# Patient Record
Sex: FEMALE | Race: WHITE | NOT HISPANIC OR LATINO
[De-identification: names, ages, dates, MRNs, and addresses within clinical notes are randomized per-mention and may not be internally consistent; named-entity substitution may affect disease eponyms.]

---

## 2020-11-12 ENCOUNTER — RESULT REVIEW (OUTPATIENT)
Age: 65
End: 2020-11-12

## 2024-11-16 DIAGNOSIS — M25.562 PAIN IN RIGHT KNEE: ICD-10-CM

## 2024-11-16 DIAGNOSIS — M54.16 RADICULOPATHY, LUMBAR REGION: ICD-10-CM

## 2024-11-16 DIAGNOSIS — M25.561 PAIN IN RIGHT KNEE: ICD-10-CM

## 2024-11-16 DIAGNOSIS — M17.0 BILATERAL PRIMARY OSTEOARTHRITIS OF KNEE: ICD-10-CM

## 2024-11-16 PROCEDURE — 20610 DRAIN/INJ JOINT/BURSA W/O US: CPT | Mod: 50

## 2024-11-16 PROCEDURE — 99204 OFFICE O/P NEW MOD 45 MIN: CPT | Mod: 25

## 2024-11-16 PROCEDURE — 73564 X-RAY EXAM KNEE 4 OR MORE: CPT | Mod: 50

## 2024-11-18 ENCOUNTER — APPOINTMENT (OUTPATIENT)
Dept: ORTHOPEDIC SURGERY | Facility: CLINIC | Age: 69
End: 2024-11-18
Payer: MEDICARE

## 2024-11-18 ENCOUNTER — NON-APPOINTMENT (OUTPATIENT)
Age: 69
End: 2024-11-18

## 2024-11-18 VITALS — HEIGHT: 61 IN | BODY MASS INDEX: 26.43 KG/M2 | WEIGHT: 140 LBS

## 2024-11-18 PROCEDURE — 99204 OFFICE O/P NEW MOD 45 MIN: CPT | Mod: 25

## 2024-11-18 PROCEDURE — 20610 DRAIN/INJ JOINT/BURSA W/O US: CPT | Mod: 50

## 2024-11-18 PROCEDURE — 99214 OFFICE O/P EST MOD 30 MIN: CPT | Mod: 25

## 2024-11-18 PROCEDURE — 73564 X-RAY EXAM KNEE 4 OR MORE: CPT | Mod: 50

## 2024-11-18 RX ORDER — MELOXICAM 7.5 MG/1
7.5 TABLET ORAL DAILY
Qty: 30 | Refills: 2 | Status: ACTIVE | COMMUNITY
Start: 2024-11-18 | End: 1900-01-01

## 2024-11-20 PROBLEM — M25.561 BILATERAL KNEE PAIN: Status: ACTIVE | Noted: 2024-11-16

## 2024-11-21 PROBLEM — M17.0 OSTEOARTHRITIS OF KNEES, BILATERAL: Status: ACTIVE | Noted: 2024-11-18

## 2024-11-21 PROBLEM — M54.16 LUMBAR RADICULOPATHY: Status: ACTIVE | Noted: 2024-11-18

## 2024-12-04 ENCOUNTER — APPOINTMENT (OUTPATIENT)
Dept: ORTHOPEDIC SURGERY | Facility: CLINIC | Age: 69
End: 2024-12-04
Payer: MEDICARE

## 2024-12-04 DIAGNOSIS — S83.231A COMPLEX TEAR OF MEDIAL MENISCUS, CURRENT INJURY, RIGHT KNEE, INITIAL ENCOUNTER: ICD-10-CM

## 2024-12-04 DIAGNOSIS — Q68.6 DISCOID MENISCUS: ICD-10-CM

## 2024-12-04 DIAGNOSIS — S83.232A COMPLEX TEAR OF MEDIAL MENISCUS, CURRENT INJURY, LEFT KNEE, INITIAL ENCOUNTER: ICD-10-CM

## 2024-12-04 DIAGNOSIS — M54.16 RADICULOPATHY, LUMBAR REGION: ICD-10-CM

## 2024-12-04 DIAGNOSIS — M17.0 BILATERAL PRIMARY OSTEOARTHRITIS OF KNEE: ICD-10-CM

## 2024-12-04 PROCEDURE — ZZZZZ: CPT

## 2025-04-22 ENCOUNTER — NON-APPOINTMENT (OUTPATIENT)
Age: 70
End: 2025-04-22

## 2025-04-23 ENCOUNTER — APPOINTMENT (OUTPATIENT)
Dept: ORTHOPEDIC SURGERY | Facility: CLINIC | Age: 70
End: 2025-04-23
Payer: MEDICARE

## 2025-04-23 VITALS — BODY MASS INDEX: 27.94 KG/M2 | HEIGHT: 61 IN | WEIGHT: 148 LBS

## 2025-04-23 DIAGNOSIS — M48.062 SPINAL STENOSIS, LUMBAR REGION WITH NEUROGENIC CLAUDICATION: ICD-10-CM

## 2025-04-23 DIAGNOSIS — M54.16 RADICULOPATHY, LUMBAR REGION: ICD-10-CM

## 2025-04-23 DIAGNOSIS — M43.10 SPONDYLOLISTHESIS, SITE UNSPECIFIED: ICD-10-CM

## 2025-04-23 PROCEDURE — 72110 X-RAY EXAM L-2 SPINE 4/>VWS: CPT

## 2025-04-23 PROCEDURE — 99214 OFFICE O/P EST MOD 30 MIN: CPT

## 2025-04-24 PROBLEM — M48.062 LUMBAR STENOSIS WITH NEUROGENIC CLAUDICATION: Status: ACTIVE | Noted: 2025-04-24

## 2025-04-24 PROBLEM — M43.10 ACQUIRED SPONDYLOLISTHESIS: Status: ACTIVE | Noted: 2025-04-24

## 2025-05-12 ENCOUNTER — APPOINTMENT (OUTPATIENT)
Dept: ORTHOPEDIC SURGERY | Facility: CLINIC | Age: 70
End: 2025-05-12
Payer: MEDICARE

## 2025-05-12 DIAGNOSIS — M43.10 SPONDYLOLISTHESIS, SITE UNSPECIFIED: ICD-10-CM

## 2025-05-12 DIAGNOSIS — M48.062 SPINAL STENOSIS, LUMBAR REGION WITH NEUROGENIC CLAUDICATION: ICD-10-CM

## 2025-05-12 PROCEDURE — 99214 OFFICE O/P EST MOD 30 MIN: CPT

## 2025-05-28 ENCOUNTER — NON-APPOINTMENT (OUTPATIENT)
Age: 70
End: 2025-05-28

## 2025-06-06 ENCOUNTER — OUTPATIENT (OUTPATIENT)
Dept: OUTPATIENT SERVICES | Facility: HOSPITAL | Age: 70
LOS: 1 days | End: 2025-06-06
Payer: MEDICARE

## 2025-06-06 VITALS
HEART RATE: 70 BPM | RESPIRATION RATE: 14 BRPM | HEIGHT: 61 IN | TEMPERATURE: 98 F | DIASTOLIC BLOOD PRESSURE: 60 MMHG | SYSTOLIC BLOOD PRESSURE: 113 MMHG | WEIGHT: 143.96 LBS | OXYGEN SATURATION: 98 %

## 2025-06-06 DIAGNOSIS — M54.16 RADICULOPATHY, LUMBAR REGION: ICD-10-CM

## 2025-06-06 DIAGNOSIS — M48.062 SPINAL STENOSIS, LUMBAR REGION WITH NEUROGENIC CLAUDICATION: ICD-10-CM

## 2025-06-06 DIAGNOSIS — Z90.89 ACQUIRED ABSENCE OF OTHER ORGANS: Chronic | ICD-10-CM

## 2025-06-06 DIAGNOSIS — Z01.818 ENCOUNTER FOR OTHER PREPROCEDURAL EXAMINATION: ICD-10-CM

## 2025-06-06 LAB
A1C WITH ESTIMATED AVERAGE GLUCOSE RESULT: 6.2 % — HIGH (ref 4–5.6)
ALBUMIN SERPL ELPH-MCNC: 3.8 G/DL — SIGNIFICANT CHANGE UP (ref 3.3–5)
ALP SERPL-CCNC: 107 U/L — SIGNIFICANT CHANGE UP (ref 30–120)
ALT FLD-CCNC: 36 U/L — SIGNIFICANT CHANGE UP (ref 10–60)
ANION GAP SERPL CALC-SCNC: 6 MMOL/L — SIGNIFICANT CHANGE UP (ref 5–17)
APTT BLD: 35.8 SEC — SIGNIFICANT CHANGE UP (ref 26.1–36.8)
AST SERPL-CCNC: 16 U/L — SIGNIFICANT CHANGE UP (ref 10–40)
BILIRUB SERPL-MCNC: 0.4 MG/DL — SIGNIFICANT CHANGE UP (ref 0.2–1.2)
BLD GP AB SCN SERPL QL: SIGNIFICANT CHANGE UP
BUN SERPL-MCNC: 14 MG/DL — SIGNIFICANT CHANGE UP (ref 7–23)
CALCIUM SERPL-MCNC: 9.5 MG/DL — SIGNIFICANT CHANGE UP (ref 8.4–10.5)
CHLORIDE SERPL-SCNC: 103 MMOL/L — SIGNIFICANT CHANGE UP (ref 96–108)
CO2 SERPL-SCNC: 31 MMOL/L — SIGNIFICANT CHANGE UP (ref 22–31)
CREAT SERPL-MCNC: 0.62 MG/DL — SIGNIFICANT CHANGE UP (ref 0.5–1.3)
EGFR: 96 ML/MIN/1.73M2 — SIGNIFICANT CHANGE UP
EGFR: 96 ML/MIN/1.73M2 — SIGNIFICANT CHANGE UP
ESTIMATED AVERAGE GLUCOSE: 131 MG/DL — HIGH (ref 68–114)
GLUCOSE SERPL-MCNC: 107 MG/DL — HIGH (ref 70–99)
HCT VFR BLD CALC: 40.9 % — SIGNIFICANT CHANGE UP (ref 34.5–45)
HGB BLD-MCNC: 13.3 G/DL — SIGNIFICANT CHANGE UP (ref 11.5–15.5)
INR BLD: 0.99 RATIO — SIGNIFICANT CHANGE UP (ref 0.85–1.16)
MCHC RBC-ENTMCNC: 26.9 PG — LOW (ref 27–34)
MCHC RBC-ENTMCNC: 32.5 G/DL — SIGNIFICANT CHANGE UP (ref 32–36)
MCV RBC AUTO: 82.6 FL — SIGNIFICANT CHANGE UP (ref 80–100)
NRBC BLD AUTO-RTO: 0 /100 WBCS — SIGNIFICANT CHANGE UP (ref 0–0)
PLATELET # BLD AUTO: 311 K/UL — SIGNIFICANT CHANGE UP (ref 150–400)
POTASSIUM SERPL-MCNC: 4.1 MMOL/L — SIGNIFICANT CHANGE UP (ref 3.5–5.3)
POTASSIUM SERPL-SCNC: 4.1 MMOL/L — SIGNIFICANT CHANGE UP (ref 3.5–5.3)
PROT SERPL-MCNC: 7.6 G/DL — SIGNIFICANT CHANGE UP (ref 6–8.3)
PROTHROM AB SERPL-ACNC: 11.5 SEC — SIGNIFICANT CHANGE UP (ref 9.9–13.4)
RBC # BLD: 4.95 M/UL — SIGNIFICANT CHANGE UP (ref 3.8–5.2)
RBC # FLD: 13.1 % — SIGNIFICANT CHANGE UP (ref 10.3–14.5)
SODIUM SERPL-SCNC: 140 MMOL/L — SIGNIFICANT CHANGE UP (ref 135–145)
WBC # BLD: 7.77 K/UL — SIGNIFICANT CHANGE UP (ref 3.8–10.5)
WBC # FLD AUTO: 7.77 K/UL — SIGNIFICANT CHANGE UP (ref 3.8–10.5)

## 2025-06-06 PROCEDURE — 86900 BLOOD TYPING SEROLOGIC ABO: CPT

## 2025-06-06 PROCEDURE — G0463: CPT

## 2025-06-06 PROCEDURE — 85610 PROTHROMBIN TIME: CPT

## 2025-06-06 PROCEDURE — 86901 BLOOD TYPING SEROLOGIC RH(D): CPT

## 2025-06-06 PROCEDURE — 80053 COMPREHEN METABOLIC PANEL: CPT

## 2025-06-06 PROCEDURE — 85730 THROMBOPLASTIN TIME PARTIAL: CPT

## 2025-06-06 PROCEDURE — 85027 COMPLETE CBC AUTOMATED: CPT

## 2025-06-06 PROCEDURE — 36415 COLL VENOUS BLD VENIPUNCTURE: CPT

## 2025-06-06 PROCEDURE — 86850 RBC ANTIBODY SCREEN: CPT

## 2025-06-06 PROCEDURE — 83036 HEMOGLOBIN GLYCOSYLATED A1C: CPT

## 2025-06-06 NOTE — H&P PST ADULT - NSICDXPROCEDURE_GEN_ALL_CORE_FT
PROCEDURES:  Laminectomy or decompressive laminectomy of lumbar spine at 2 levels 06-Jun-2025 07:31:33 L4-5, L5-S1 Debbie Abarca   PROCEDURES:  Laminectomy or decompressive laminectomy of lumbar spine at 2 levels 06-Jun-2025 07:31:33 L4-5, L5-S1 with fusion Debbie Abarca

## 2025-06-06 NOTE — H&P PST ADULT - PROBLEM SELECTOR PLAN 1
scheduled for decompressive laminectomy L$-%, L5-S1  fusion   will obtain medical clearance   pre op instructions on medications

## 2025-06-06 NOTE — H&P PST ADULT - NEUROLOGICAL
sensation intact/responds to pain negative sensation intact/responds to pain/responds to verbal commands

## 2025-06-06 NOTE — H&P PST ADULT - HISTORY OF PRESENT ILLNESS
70 y/o female with 4 year history of lower back pain and pain has progressively gotten worse over the past 6 months . patient reports  bilateral lower back pain  radiating into bilateral lateral hips and the right leg pain continues into the right foot and left leg has started radiating into mid thigh.Recently has undergone FERNANDO for the lumbar spine on  3/2025 with no relief of her symptoms. Pian is worsened by walking , standing and activities with pain scale 7/10 . currently not taking any medications . scheduled for decompressive laminectomy L4-5,L5-S1 with fusion on 6/17/25

## 2025-06-06 NOTE — H&P PST ADULT - GASTROINTESTINAL
details… nontender/nondistended/normal active bowel sounds soft/nontender/nondistended/normal active bowel sounds

## 2025-06-06 NOTE — H&P PST ADULT - NSICDXPASTMEDICALHX_GEN_ALL_CORE_FT
PAST MEDICAL HISTORY:  History of psoriasis     HLD (hyperlipidemia)     HTN (hypertension)     Lumbar radiculopathy

## 2025-06-06 NOTE — H&P PST ADULT - NSICDXFAMILYHX_GEN_ALL_CORE_FT
FAMILY HISTORY:  Father  Still living? No  FH: CAD (coronary artery disease), Age at diagnosis: Age Unknown    Mother  Still living? Yes, Estimated age: Age Unknown  Family history of breast cancer in mother, Age at diagnosis: Age Unknown  Family history of colon cancer in mother, Age at diagnosis: Age Unknown    Child  Still living? Yes, Estimated age: Age Unknown  Family history of psoriasis, Age at diagnosis: Age Unknown

## 2025-06-17 ENCOUNTER — RESULT REVIEW (OUTPATIENT)
Age: 70
End: 2025-06-17

## 2025-06-17 ENCOUNTER — TRANSCRIPTION ENCOUNTER (OUTPATIENT)
Age: 70
End: 2025-06-17

## 2025-06-17 ENCOUNTER — INPATIENT (INPATIENT)
Facility: HOSPITAL | Age: 70
LOS: 2 days | Discharge: HOME | DRG: 552 | End: 2025-06-20
Attending: ORTHOPAEDIC SURGERY | Admitting: ORTHOPAEDIC SURGERY
Payer: MEDICARE

## 2025-06-17 ENCOUNTER — APPOINTMENT (OUTPATIENT)
Dept: ORTHOPEDIC SURGERY | Facility: HOSPITAL | Age: 70
End: 2025-06-17

## 2025-06-17 VITALS
HEIGHT: 61 IN | DIASTOLIC BLOOD PRESSURE: 84 MMHG | WEIGHT: 142.42 LBS | TEMPERATURE: 97 F | OXYGEN SATURATION: 96 % | RESPIRATION RATE: 19 BRPM | HEART RATE: 97 BPM | SYSTOLIC BLOOD PRESSURE: 153 MMHG

## 2025-06-17 DIAGNOSIS — Z90.89 ACQUIRED ABSENCE OF OTHER ORGANS: Chronic | ICD-10-CM

## 2025-06-17 DIAGNOSIS — M48.062 SPINAL STENOSIS, LUMBAR REGION WITH NEUROGENIC CLAUDICATION: ICD-10-CM

## 2025-06-17 DIAGNOSIS — M54.16 RADICULOPATHY, LUMBAR REGION: ICD-10-CM

## 2025-06-17 PROBLEM — I10 ESSENTIAL (PRIMARY) HYPERTENSION: Chronic | Status: ACTIVE | Noted: 2025-06-06

## 2025-06-17 PROBLEM — E78.5 HYPERLIPIDEMIA, UNSPECIFIED: Chronic | Status: ACTIVE | Noted: 2025-06-06

## 2025-06-17 PROCEDURE — 99222 1ST HOSP IP/OBS MODERATE 55: CPT

## 2025-06-17 PROCEDURE — 63048 LAM FACETEC &FORAMOT EA ADDL: CPT

## 2025-06-17 PROCEDURE — 88311 DECALCIFY TISSUE: CPT | Mod: 26

## 2025-06-17 PROCEDURE — 22612 ARTHRD PST TQ 1NTRSPC LUMBAR: CPT | Mod: 82

## 2025-06-17 PROCEDURE — 22614 ARTHRD PST TQ 1NTRSPC EA ADD: CPT

## 2025-06-17 PROCEDURE — 22614 ARTHRD PST TQ 1NTRSPC EA ADD: CPT | Mod: 82

## 2025-06-17 PROCEDURE — 22612 ARTHRD PST TQ 1NTRSPC LUMBAR: CPT

## 2025-06-17 PROCEDURE — 63048 LAM FACETEC &FORAMOT EA ADDL: CPT | Mod: 82

## 2025-06-17 PROCEDURE — 88304 TISSUE EXAM BY PATHOLOGIST: CPT | Mod: 26

## 2025-06-17 PROCEDURE — 22842 INSERT SPINE FIXATION DEVICE: CPT | Mod: 82

## 2025-06-17 PROCEDURE — 63047 LAM FACETEC & FORAMOT LUMBAR: CPT | Mod: 82

## 2025-06-17 PROCEDURE — 63047 LAM FACETEC & FORAMOT LUMBAR: CPT

## 2025-06-17 PROCEDURE — 22842 INSERT SPINE FIXATION DEVICE: CPT

## 2025-06-17 DEVICE — SCREW SET MAS 2 PK SS 5.5/6MM: Type: IMPLANTABLE DEVICE | Status: FUNCTIONAL

## 2025-06-17 DEVICE — GRAFT I-FACTOR PUTTY 5CC: Type: IMPLANTABLE DEVICE | Status: FUNCTIONAL

## 2025-06-17 DEVICE — SCREW SET MAS 4 PK SS 5.5/6MM: Type: IMPLANTABLE DEVICE | Status: FUNCTIONAL

## 2025-06-17 DEVICE — IMPLANTABLE DEVICE: Type: IMPLANTABLE DEVICE | Status: FUNCTIONAL

## 2025-06-17 DEVICE — SURGIFLO HEMOSTATIC MATRIX KIT: Type: IMPLANTABLE DEVICE | Status: FUNCTIONAL

## 2025-06-17 DEVICE — LUKENS BONE WAX 2.5G: Type: IMPLANTABLE DEVICE | Status: FUNCTIONAL

## 2025-06-17 DEVICE — SURGIFOAM 8 X 12.5CM X 10MM (100): Type: IMPLANTABLE DEVICE | Status: FUNCTIONAL

## 2025-06-17 DEVICE — ROD TI 60MM: Type: IMPLANTABLE DEVICE | Status: FUNCTIONAL

## 2025-06-17 RX ORDER — METHOCARBAMOL 500 MG/1
500 TABLET, FILM COATED ORAL EVERY 8 HOURS
Refills: 0 | Status: DISCONTINUED | OUTPATIENT
Start: 2025-06-17 | End: 2025-06-20

## 2025-06-17 RX ORDER — ACETAMINOPHEN 500 MG/5ML
1000 LIQUID (ML) ORAL EVERY 8 HOURS
Refills: 0 | Status: DISCONTINUED | OUTPATIENT
Start: 2025-06-17 | End: 2025-06-19

## 2025-06-17 RX ORDER — AMLODIPINE BESYLATE 10 MG/1
10 TABLET ORAL DAILY
Refills: 0 | Status: DISCONTINUED | OUTPATIENT
Start: 2025-06-19 | End: 2025-06-20

## 2025-06-17 RX ORDER — HYDROMORPHONE/SOD CHLOR,ISO/PF 2 MG/10 ML
4 SYRINGE (ML) INJECTION
Refills: 0 | Status: DISCONTINUED | OUTPATIENT
Start: 2025-06-17 | End: 2025-06-19

## 2025-06-17 RX ORDER — CEFAZOLIN SODIUM IN 0.9 % NACL 3 G/100 ML
2000 INTRAVENOUS SOLUTION, PIGGYBACK (ML) INTRAVENOUS EVERY 8 HOURS
Refills: 0 | Status: COMPLETED | OUTPATIENT
Start: 2025-06-17 | End: 2025-06-18

## 2025-06-17 RX ORDER — ONDANSETRON HCL/PF 4 MG/2 ML
4 VIAL (ML) INJECTION ONCE
Refills: 0 | Status: DISCONTINUED | OUTPATIENT
Start: 2025-06-17 | End: 2025-06-17

## 2025-06-17 RX ORDER — TRANEXAMIC ACID 1000 MG/10
2000 AMPUL (ML) INTRAVENOUS ONCE
Refills: 0 | Status: COMPLETED | OUTPATIENT
Start: 2025-06-17 | End: 2025-06-17

## 2025-06-17 RX ORDER — OXYCODONE HYDROCHLORIDE 30 MG/1
5 TABLET ORAL ONCE
Refills: 0 | Status: DISCONTINUED | OUTPATIENT
Start: 2025-06-17 | End: 2025-06-17

## 2025-06-17 RX ORDER — MAGNESIUM, ALUMINUM HYDROXIDE 200-200 MG
30 TABLET,CHEWABLE ORAL EVERY 12 HOURS
Refills: 0 | Status: DISCONTINUED | OUTPATIENT
Start: 2025-06-17 | End: 2025-06-20

## 2025-06-17 RX ORDER — SENNA 187 MG
2 TABLET ORAL AT BEDTIME
Refills: 0 | Status: DISCONTINUED | OUTPATIENT
Start: 2025-06-17 | End: 2025-06-20

## 2025-06-17 RX ORDER — LOSARTAN POTASSIUM 100 MG/1
50 TABLET, FILM COATED ORAL DAILY
Refills: 0 | Status: DISCONTINUED | OUTPATIENT
Start: 2025-06-19 | End: 2025-06-20

## 2025-06-17 RX ORDER — CEFAZOLIN SODIUM IN 0.9 % NACL 3 G/100 ML
2000 INTRAVENOUS SOLUTION, PIGGYBACK (ML) INTRAVENOUS ONCE
Refills: 0 | Status: COMPLETED | OUTPATIENT
Start: 2025-06-17 | End: 2025-06-17

## 2025-06-17 RX ORDER — POLYETHYLENE GLYCOL 3350 17 G/17G
17 POWDER, FOR SOLUTION ORAL DAILY
Refills: 0 | Status: DISCONTINUED | OUTPATIENT
Start: 2025-06-17 | End: 2025-06-20

## 2025-06-17 RX ORDER — ACETAMINOPHEN 500 MG/5ML
1000 LIQUID (ML) ORAL ONCE
Refills: 0 | Status: COMPLETED | OUTPATIENT
Start: 2025-06-17 | End: 2025-06-17

## 2025-06-17 RX ORDER — ATORVASTATIN CALCIUM 80 MG/1
10 TABLET, FILM COATED ORAL AT BEDTIME
Refills: 0 | Status: DISCONTINUED | OUTPATIENT
Start: 2025-06-17 | End: 2025-06-20

## 2025-06-17 RX ORDER — HYDROMORPHONE/SOD CHLOR,ISO/PF 2 MG/10 ML
0.5 SYRINGE (ML) INJECTION
Refills: 0 | Status: DISCONTINUED | OUTPATIENT
Start: 2025-06-17 | End: 2025-06-17

## 2025-06-17 RX ORDER — APREPITANT 40 MG/1
40 CAPSULE ORAL ONCE
Refills: 0 | Status: COMPLETED | OUTPATIENT
Start: 2025-06-17 | End: 2025-06-17

## 2025-06-17 RX ORDER — LOSARTAN POTASSIUM 100 MG/1
1 TABLET, FILM COATED ORAL
Refills: 0 | DISCHARGE

## 2025-06-17 RX ORDER — AMLODIPINE BESYLATE 10 MG/1
1 TABLET ORAL
Refills: 0 | DISCHARGE

## 2025-06-17 RX ORDER — EZETIMIBE 10 MG/1
10 TABLET ORAL DAILY
Refills: 0 | Status: DISCONTINUED | OUTPATIENT
Start: 2025-06-17 | End: 2025-06-20

## 2025-06-17 RX ORDER — HYDROMORPHONE/SOD CHLOR,ISO/PF 2 MG/10 ML
2 SYRINGE (ML) INJECTION
Refills: 0 | Status: DISCONTINUED | OUTPATIENT
Start: 2025-06-17 | End: 2025-06-19

## 2025-06-17 RX ORDER — SODIUM CHLORIDE 9 G/1000ML
1000 INJECTION, SOLUTION INTRAVENOUS
Refills: 0 | Status: DISCONTINUED | OUTPATIENT
Start: 2025-06-17 | End: 2025-06-17

## 2025-06-17 RX ORDER — SODIUM CHLORIDE 9 G/1000ML
1000 INJECTION, SOLUTION INTRAVENOUS
Refills: 0 | Status: DISCONTINUED | OUTPATIENT
Start: 2025-06-17 | End: 2025-06-20

## 2025-06-17 RX ORDER — HYDROMORPHONE/SOD CHLOR,ISO/PF 2 MG/10 ML
0.5 SYRINGE (ML) INJECTION
Refills: 0 | Status: DISCONTINUED | OUTPATIENT
Start: 2025-06-17 | End: 2025-06-19

## 2025-06-17 RX ORDER — HYDROMORPHONE/SOD CHLOR,ISO/PF 2 MG/10 ML
0.2 SYRINGE (ML) INJECTION
Refills: 0 | Status: DISCONTINUED | OUTPATIENT
Start: 2025-06-17 | End: 2025-06-17

## 2025-06-17 RX ORDER — EZETIMIBE 10 MG/1
1 TABLET ORAL
Refills: 0 | DISCHARGE

## 2025-06-17 RX ORDER — ONDANSETRON HCL/PF 4 MG/2 ML
4 VIAL (ML) INJECTION EVERY 6 HOURS
Refills: 0 | Status: DISCONTINUED | OUTPATIENT
Start: 2025-06-17 | End: 2025-06-20

## 2025-06-17 RX ADMIN — METHOCARBAMOL 500 MILLIGRAM(S): 500 TABLET, FILM COATED ORAL at 21:31

## 2025-06-17 RX ADMIN — Medication 1000 MILLIGRAM(S): at 23:00

## 2025-06-17 RX ADMIN — Medication 0.5 MILLIGRAM(S): at 14:57

## 2025-06-17 RX ADMIN — ATORVASTATIN CALCIUM 10 MILLIGRAM(S): 80 TABLET, FILM COATED ORAL at 21:31

## 2025-06-17 RX ADMIN — SODIUM CHLORIDE 70 MILLILITER(S): 9 INJECTION, SOLUTION INTRAVENOUS at 14:22

## 2025-06-17 RX ADMIN — APREPITANT 40 MILLIGRAM(S): 40 CAPSULE ORAL at 08:18

## 2025-06-17 RX ADMIN — Medication 1000 MILLIGRAM(S): at 00:00

## 2025-06-17 RX ADMIN — Medication 2 MILLIGRAM(S): at 18:00

## 2025-06-17 RX ADMIN — Medication 1000 MILLIGRAM(S): at 21:31

## 2025-06-17 RX ADMIN — Medication 100 MILLIGRAM(S): at 17:01

## 2025-06-17 RX ADMIN — Medication 400 MILLIGRAM(S): at 16:28

## 2025-06-17 RX ADMIN — SODIUM CHLORIDE 100 MILLILITER(S): 9 INJECTION, SOLUTION INTRAVENOUS at 16:28

## 2025-06-17 RX ADMIN — Medication 0.5 MILLIGRAM(S): at 14:21

## 2025-06-17 RX ADMIN — Medication 2 MILLIGRAM(S): at 17:04

## 2025-06-17 NOTE — BRIEF OPERATIVE NOTE - NSICDXBRIEFPREOP_GEN_ALL_CORE_FT
PRE-OP DIAGNOSIS:  Spinal stenosis of lumbar region with neurogenic claudication 17-Jun-2025 13:00:52  Romario Mcpherson

## 2025-06-17 NOTE — CONSULT NOTE ADULT - SUBJECTIVE AND OBJECTIVE BOX
HPI:  69F with HTN, HLD, lumbar radiculopathy who has been suffering with back pain for years.  Patient experiencing pain that would radiate down her legs.  Has tried conservative measures.  Interfering with her ADLs.  Recommended for surgery.  Patient seen in PACU.  Is currently have some lumbar pain.  Some weakness still in her legs with left more than right but no numbness.  No nausea.  No other complaints.      PAST MEDICAL & SURGICAL HISTORY:  HTN (hypertension)  HLD (hyperlipidemia)  Lumbar radiculopathy  History of psoriasis  S/P tonsillectomy    REVIEW OF SYSTEMS:  CONSTITUTIONAL:    no fever or weight loss or fatigue  EYES:    no eye pain or visual disturbances or discharge  ENMT:     no difficulty hearing or tinnitus or vertigo, no sinus or throat pain  NECK:    no pain or stiffness  RESPIRATORY:    no cough or wheezing or chills or hemoptysis, no shortness of breath  CARDIOVASCULAR:    no chest pain or palpitations or dizziness or leg swelling  GASTROINTESTINAL:    no abdominal or epigastric pain. no nausea or vomiting or hematemesis, no diarrhea or constipation. no melena or hematochezia.  GENITOURINARY:    no dysuria or frequency or hematuria or incontinence  NEUROLOGICAL:    slight weakness in her legs  SKIN:    no itching or burning or rashes or lesions   LYMPH NODES:    no enlarged glands  ENDOCRINE:    no heat or cold intolerance, no hair loss, no polydipsia or polyuria  MUSCULOSKELETAL:    no joint pain or swelling, no muscle or back or extremity pain  PSYCHIATRIC:    no depression or anxiety or mood swings or difficulty sleeping  HEME/LYMPH:    no easy bruising or bleeding gums  ALLERGY AND IMMUNOLOGIC:    no hives or eczema      HOME MEDICATIONS:    Home Medications:  amLODIPine 10 mg oral tablet: 1 tab(s) orally once a day (17 Jun 2025 08:10)  ezetimibe 10 mg oral tablet: 1 tab(s) orally once a day (17 Jun 2025 08:10)  losartan 50 mg oral tablet: 1 tab(s) orally once a day (17 Jun 2025 08:10)  simvastatin 20 mg oral tablet: 1 tab(s) orally once a day (17 Jun 2025 08:10)    ALLERGIES and INTOLERANCES:  No Known Allergies    SOCIAL HISTORY:  - no smoking  - no etoh use    FAMILY HISTORY:  Family history of breast cancer in mother (Mother)  Family history of colon cancer in mother (Mother)  FH: CAD (coronary artery disease) (Father)  Family history of psoriasis (Child)    PHYSICAL EXAM:  Vital Signs Last 24 Hrs  T(C): 36.4 (17 Jun 2025 12:56), Max: 36.4 (17 Jun 2025 12:56)  T(F): 97.6 (17 Jun 2025 12:56), Max: 97.6 (17 Jun 2025 12:56)  HR: 71 (17 Jun 2025 13:56) (70 - 97)  BP: 97/58 (17 Jun 2025 13:56) (94/55 - 153/84)  BP(mean): --  RR: 17 (17 Jun 2025 13:56) (12 - 19)  SpO2: 97% (17 Jun 2025 13:56) (92% - 98%)    Parameters below as of 17 Jun 2025 13:56  Patient On (Oxygen Delivery Method): nasal cannula  O2 Flow (L/min): 2      GENERAL:     age appropriate, in NAD   HEAD:     atraumatic, normocephalic  EYES:     EOMI, conjunctiva and sclera clear  RESPIRATORY:     clear to auscultation bilaterally, no rales or rhonchi or wheezing or rubs  CARDIOVASCULAR:     regular rate and rhythm, no murmurs or rubs or gallops  GASTROINTESTINAL:     soft, nontender, nondistended, bowel sounds present  EXTREMITIES:     no clubbing or cyanosis or edema  MUSCULOSKELETAL:     no joint pain or swelling or deformities  NERVOUS SYSTEM:     does move both legs and feet, able to elevate legs, no sensory deficits noted  PSYCH:     appropriate, slightly groggy from anesthesia still      LABS:                        13.3   7.77  )-----------( 311      ( 06 Jun 2025 07:40 )             40.9     06 Jun 2025 07:40    140    |  103    |  14     ----------------------------<  107[H]  4.1     |  31     |  0.62         Ca    9.5        06 Jun 2025 07:40    TPro  7.6    /  Alb  3.8    /  TBili  0.4    /  DBili  x      /  AST  16     /  ALT  36     /  AlkPhos  107    06 Jun 2025 07:40    LIVER FUNCTIONS - ( 06 Jun 2025 07:40 )  Alb: 3.8 g/dL / Pro: 7.6 g/dL / ALK PHOS: 107 U/L / ALT: 36 U/L / AST: 16 U/L / GGT: x           PT/INR - ( 06 Jun 2025 07:40 )   PT: 11.5 ;   INR: 0.99          PTT - ( 06 Jun 2025 07:40 )  PTT:35.8

## 2025-06-17 NOTE — CONSULT NOTE ADULT - ASSESSMENT
69F with HTN, HLD, lumbar radiculopathy who has been suffering with back pain for years.      Lumbar surgery for lumbar radiculopathy - Laminectomy, spine, lumbar, with interlaminar lumbar instrumented fusion with L3-4 right fenestration  - POD #0    - reviewed patient's medical clearance and other outpatient notes  - post-op care as per ortho  - no DVT AC secondary to spine surgery  - pain control with standing tylenol 1000mg, dilaudid 2mg and 4mg for mod/severe respectively pain, dilaudid 0.5mg IV for breakthrough pain  - start robaxin PRN muscle spasms  - PT/ OT  - advance diet as tolerated  - anti-emetics PRN  - incentive spirometer  - bowel regiment    HTN/HLD  - restart amlodipine on POD #2  - cont ezetimibe  - restart losartan 50mg on POD #2  - cont simvastatin    Preventive measures  - no AC secondary to spine surgery  - SCD only

## 2025-06-17 NOTE — PHYSICAL THERAPY INITIAL EVALUATION ADULT - ADDITIONAL COMMENTS
Pt resides in pvt home with spouse, available to assist as needed upon d/c. Pt states 3STE +HR, denies stairs within. Pt needs RW. Pt has a walk-in shower +grab bars. Pt reports was independent prior to admission.

## 2025-06-17 NOTE — PRE-OP CHECKLIST - LOOSE TEETH
no

## 2025-06-17 NOTE — PHYSICAL THERAPY INITIAL EVALUATION ADULT - PERTINENT HX OF CURRENT PROBLEM, REHAB EVAL
pt is a 70 y/o F s/p decompressive laminectomy L4-5 L5-S1 and posterolateral fusion and any indicated procedures 6/17/25

## 2025-06-17 NOTE — PHYSICAL THERAPY INITIAL EVALUATION ADULT - MANUAL MUSCLE TESTING RESULTS, REHAB EVAL
generalized weakness secondary to surgery/spinal precautions B UE LE grossly >=3/5/grossly assessed due to

## 2025-06-17 NOTE — BRIEF OPERATIVE NOTE - NSICDXBRIEFPROCEDURE_GEN_ALL_CORE_FT
PROCEDURES:  Laminectomy, spine, lumbar, with interlaminar lumbar instrumented fusion 17-Jun-2025 12:59:55 L4-S1, with L3-4 right fenestration Romario Mcpherson

## 2025-06-17 NOTE — BRIEF OPERATIVE NOTE - NSICDXBRIEFPOSTOP_GEN_ALL_CORE_FT
POST-OP DIAGNOSIS:  Spinal stenosis of lumbar region with neurogenic claudication 17-Jun-2025 13:01:00  Romario Mcpherson

## 2025-06-18 ENCOUNTER — TRANSCRIPTION ENCOUNTER (OUTPATIENT)
Age: 70
End: 2025-06-18

## 2025-06-18 LAB
ANION GAP SERPL CALC-SCNC: 10 MMOL/L — SIGNIFICANT CHANGE UP (ref 5–17)
BASOPHILS # BLD AUTO: 0.01 K/UL — SIGNIFICANT CHANGE UP (ref 0–0.2)
BASOPHILS NFR BLD AUTO: 0.1 % — SIGNIFICANT CHANGE UP (ref 0–2)
BUN SERPL-MCNC: 9 MG/DL — SIGNIFICANT CHANGE UP (ref 7–23)
CALCIUM SERPL-MCNC: 9.4 MG/DL — SIGNIFICANT CHANGE UP (ref 8.4–10.5)
CHLORIDE SERPL-SCNC: 105 MMOL/L — SIGNIFICANT CHANGE UP (ref 96–108)
CO2 SERPL-SCNC: 27 MMOL/L — SIGNIFICANT CHANGE UP (ref 22–31)
CREAT SERPL-MCNC: 0.49 MG/DL — LOW (ref 0.5–1.3)
EGFR: 102 ML/MIN/1.73M2 — SIGNIFICANT CHANGE UP
EGFR: 102 ML/MIN/1.73M2 — SIGNIFICANT CHANGE UP
EOSINOPHIL # BLD AUTO: 0 K/UL — SIGNIFICANT CHANGE UP (ref 0–0.5)
EOSINOPHIL NFR BLD AUTO: 0 % — SIGNIFICANT CHANGE UP (ref 0–6)
GLUCOSE SERPL-MCNC: 128 MG/DL — HIGH (ref 70–99)
HCT VFR BLD CALC: 38.6 % — SIGNIFICANT CHANGE UP (ref 34.5–45)
HGB BLD-MCNC: 12.3 G/DL — SIGNIFICANT CHANGE UP (ref 11.5–15.5)
IMM GRANULOCYTES # BLD AUTO: 0.05 K/UL — SIGNIFICANT CHANGE UP (ref 0–0.07)
IMM GRANULOCYTES NFR BLD AUTO: 0.4 % — SIGNIFICANT CHANGE UP (ref 0–0.9)
LYMPHOCYTES # BLD AUTO: 1 K/UL — SIGNIFICANT CHANGE UP (ref 1–3.3)
LYMPHOCYTES NFR BLD AUTO: 7.7 % — LOW (ref 13–44)
MCHC RBC-ENTMCNC: 26 PG — LOW (ref 27–34)
MCHC RBC-ENTMCNC: 31.9 G/DL — LOW (ref 32–36)
MCV RBC AUTO: 81.6 FL — SIGNIFICANT CHANGE UP (ref 80–100)
MONOCYTES # BLD AUTO: 0.34 K/UL — SIGNIFICANT CHANGE UP (ref 0–0.9)
MONOCYTES NFR BLD AUTO: 2.6 % — SIGNIFICANT CHANGE UP (ref 2–14)
NEUTROPHILS # BLD AUTO: 11.66 K/UL — HIGH (ref 1.8–7.4)
NEUTROPHILS NFR BLD AUTO: 89.2 % — HIGH (ref 43–77)
NRBC # BLD AUTO: 0 K/UL — SIGNIFICANT CHANGE UP (ref 0–0)
NRBC # FLD: 0 K/UL — SIGNIFICANT CHANGE UP (ref 0–0)
NRBC BLD AUTO-RTO: 0 /100 WBCS — SIGNIFICANT CHANGE UP (ref 0–0)
PLATELET # BLD AUTO: 324 K/UL — SIGNIFICANT CHANGE UP (ref 150–400)
PMV BLD: 10.1 FL — SIGNIFICANT CHANGE UP (ref 7–13)
POTASSIUM SERPL-MCNC: 3.8 MMOL/L — SIGNIFICANT CHANGE UP (ref 3.5–5.3)
POTASSIUM SERPL-SCNC: 3.8 MMOL/L — SIGNIFICANT CHANGE UP (ref 3.5–5.3)
RBC # BLD: 4.73 M/UL — SIGNIFICANT CHANGE UP (ref 3.8–5.2)
RBC # FLD: 13 % — SIGNIFICANT CHANGE UP (ref 10.3–14.5)
SODIUM SERPL-SCNC: 142 MMOL/L — SIGNIFICANT CHANGE UP (ref 135–145)
WBC # BLD: 13.06 K/UL — HIGH (ref 3.8–10.5)
WBC # FLD AUTO: 13.06 K/UL — HIGH (ref 3.8–10.5)

## 2025-06-18 PROCEDURE — 72100 X-RAY EXAM L-S SPINE 2/3 VWS: CPT | Mod: 26

## 2025-06-18 PROCEDURE — 99232 SBSQ HOSP IP/OBS MODERATE 35: CPT

## 2025-06-18 RX ORDER — ACETAMINOPHEN 500 MG/5ML
2 LIQUID (ML) ORAL
Qty: 0 | Refills: 0 | DISCHARGE
Start: 2025-06-18

## 2025-06-18 RX ORDER — HYDROMORPHONE/SOD CHLOR,ISO/PF 2 MG/10 ML
1 SYRINGE (ML) INJECTION
Qty: 40 | Refills: 0
Start: 2025-06-18

## 2025-06-18 RX ORDER — POLYETHYLENE GLYCOL 3350 17 G/17G
17 POWDER, FOR SOLUTION ORAL
Qty: 0 | Refills: 0 | DISCHARGE
Start: 2025-06-18

## 2025-06-18 RX ORDER — TIZANIDINE 4 MG/1
2 TABLET ORAL
Qty: 30 | Refills: 0
Start: 2025-06-18

## 2025-06-18 RX ORDER — SENNA 187 MG
2 TABLET ORAL
Qty: 0 | Refills: 0 | DISCHARGE
Start: 2025-06-18

## 2025-06-18 RX ADMIN — Medication 1000 MILLIGRAM(S): at 14:01

## 2025-06-18 RX ADMIN — Medication 1000 MILLIGRAM(S): at 05:31

## 2025-06-18 RX ADMIN — POLYETHYLENE GLYCOL 3350 17 GRAM(S): 17 POWDER, FOR SOLUTION ORAL at 22:05

## 2025-06-18 RX ADMIN — Medication 1000 MILLIGRAM(S): at 22:01

## 2025-06-18 RX ADMIN — ATORVASTATIN CALCIUM 10 MILLIGRAM(S): 80 TABLET, FILM COATED ORAL at 22:03

## 2025-06-18 RX ADMIN — Medication 4 MILLIGRAM(S): at 00:34

## 2025-06-18 RX ADMIN — METHOCARBAMOL 500 MILLIGRAM(S): 500 TABLET, FILM COATED ORAL at 05:31

## 2025-06-18 RX ADMIN — Medication 4 MILLIGRAM(S): at 01:15

## 2025-06-18 RX ADMIN — SODIUM CHLORIDE 100 MILLILITER(S): 9 INJECTION, SOLUTION INTRAVENOUS at 00:31

## 2025-06-18 RX ADMIN — Medication 100 MILLIGRAM(S): at 00:31

## 2025-06-18 RX ADMIN — Medication 2 TABLET(S): at 22:01

## 2025-06-18 RX ADMIN — Medication 1000 MILLIGRAM(S): at 22:12

## 2025-06-18 RX ADMIN — Medication 1000 MILLIGRAM(S): at 06:00

## 2025-06-18 RX ADMIN — EZETIMIBE 10 MILLIGRAM(S): 10 TABLET ORAL at 22:04

## 2025-06-18 NOTE — CARE COORDINATION ASSESSMENT. - NSCAREPROVIDERS_GEN_ALL_CORE_FT
CARE PROVIDERS:  Administration: Virgen Silverio  Administration: Brynn Becker  Admitting: Travon Morrow  Attending: Travon Morrow  Covering Team: Vladimir Boyer  Infection Control: Jodi Martinez  Nurse: Lori Henry  Nurse: Lyndsay Long  Occupational Therapy: Frandy Bryan  Occupational Therapy: Sofia Christiansen  PCA/Nursing Assistant: Chan Paz  Physical Therapy: Lucie Kaur  Physical Therapy: Kwabena Charles  Primary Team: Romario Mcpherson  Primary Team: Parag Medina  Primary Team: Mg Akers  Primary Team: Alejandra Traylor  Primary Team: Alanis Alejo  Primary Team: Odalis Marquis  Primary Team: Jose Carlos Lopez  Primary Team: Lorena Drake  Respiratory Therapy: Sergei Rushing  : Aleja Smith  Team: [facility] Hospitalists, Team  UR// Supp. Assoc.: Aylin Mathur

## 2025-06-18 NOTE — OCCUPATIONAL THERAPY INITIAL EVALUATION ADULT - PERTINENT HX OF CURRENT PROBLEM, REHAB EVAL
pt is a 68 y/o F s/p decompressive laminectomy L4-5 L5-S1 and posterolateral fusion and any indicated procedures 6/17/25

## 2025-06-18 NOTE — CARE COORDINATION ASSESSMENT. - NSPASTMEDSURGHISTORY_GEN_ALL_CORE_FT
PAST MEDICAL & SURGICAL HISTORY:  History of psoriasis      Lumbar radiculopathy      HLD (hyperlipidemia)      HTN (hypertension)      S/P tonsillectomy

## 2025-06-18 NOTE — DISCHARGE NOTE NURSING/CASE MANAGEMENT/SOCIAL WORK - PATIENT PORTAL LINK FT
You can access the FollowMyHealth Patient Portal offered by [facility] by registering at the following website: [email or web address]. By joining [employer]’s FollowMyHealth portal, you will also be able to view your health information using other applications (apps) compatible with our system.

## 2025-06-18 NOTE — CARE COORDINATION ASSESSMENT. - NSDCPLANSERVICES_GEN_ALL_CORE
CM met with patient at bedside.  Patient. A&O x 4. Pt s/p  PROCEDURES:   Laminectomy, spine, lumbar, with interlaminar lumbar instrumented fusion 17-Jun-2025.  Pt  resting comfortably / Pt has no complaints at this time.  CM explained role of CM and availability to assist with discharge planning throughout stay.   Provided CM contact information and pt. verbalized understanding. Pt resides in pvt home with spouse, available to assist as needed upon d/c. Pt states 3STE +HR, denies stairs within. Pt needs RW. Pt has a walk-in shower +grab bars. Pt reports was independent prior to admission.  Patient identified her  as her caregiver at home who will assist her during her recuperation and will transport her home and to MD appointments.   Pt. is agreeable with PT/OT's recommendations for d/c plan to home. Patient will follow with her surgeon and start outpatient pt when cleared by orthopedics. Anticipate dc in the next 24 hrs, IMM reviewed with patient at bedside and signed.    Pt verbalizing understanding and in agreement with d/c plans.  DME: RX for rolling walker sent to [id number] to process and deliver to bedside.   pcp: Deacon Hernandez [phone]  Pt stated she will be receiving meds to bed from [facility] pharmacy prior to DC./No Anticipated Discharge Needs

## 2025-06-18 NOTE — DISCHARGE NOTE PROVIDER - NSDCFUSCHEDAPPT_GEN_ALL_CORE_FT
Travon Morrow  [facility]  ORTHOSURG [address]  Scheduled Appointment: 07/02/2025

## 2025-06-18 NOTE — DISCHARGE NOTE PROVIDER - NSDCCPCAREPLAN_GEN_ALL_CORE_FT
PRINCIPAL DISCHARGE DIAGNOSIS  Diagnosis: Lumbar spinal stenosis  Assessment and Plan of Treatment: with instability

## 2025-06-18 NOTE — OCCUPATIONAL THERAPY INITIAL EVALUATION ADULT - ADDITIONAL COMMENTS
Pt lives in private home with spouse with 3 LAURA with +HR with 0 inside. Pt was ADl and transfer I, needs RW (ordered). Pt has walk in shower with grab bars.

## 2025-06-18 NOTE — DISCHARGE NOTE PROVIDER - NSDCCPTREATMENT_GEN_ALL_CORE_FT
PRINCIPAL PROCEDURE  Procedure: Laminectomy with fusion of lumbar spine by posterior or posterolateral approach  Findings and Treatment: You have just had spine surgery.  Please take care of your back by adhering to some basic recommendations.  Your surgeon recommends taking acetaminophen (tylenol) 1000mg 3 times per day for the next 14 days.  Apply icepacks to the surgical area to reduce swelling and discomfort.  This can help to minimize the need for stronger medications.  No heavy lifting. Do not lift more than 10 pounds.  Avoid twisting and bending over.  Do NOT drive a car.  You may be driven in a car.  You may shower if the dressing is the clear plastic type or if you cover the existing dressing with plastic and tape to prevent it from getting wet.  Change dressing with dry, sterile gauze and tape if it becomes loose, wet or soiled.     Observe low back precautions as described by the Physical Therapist.  Walking is your best exercise.  It is best not to remain in one position for long periods such as long car rides.  Constipation is a common problem associated with surgery and pain medications.  You should ensure that you are drinking plenty of fluids and increasing your fruit and vegetable intake.  You are advised to take Docusate 100mg three times per day to prevent opioid induced constipation.  To treat opioid induced constipation use Senna (Senokot) or Bisacodyl (Dulcolax) or PEG 3350 (Miralax) every evening until your first bowel movement and then every evening as needed.  To treat opioid induced bloating and gas pain use Simethicone (Gas-X) 80 mg per label directions.   Smoking should be avoided.  Tobacco products are associated with back problems.       Call the doctor with questions, fevers, severe headache, bowel or bladder dysfunction, new numbness/weakness or new pain not relieved by medication, ice pack and change of position.  You should see your surgeon for follow up within 14 days of surgery.

## 2025-06-18 NOTE — DISCHARGE NOTE PROVIDER - CARE PROVIDER_API CALL
Leone, Vincent Jean-Claude  Orthopaedic Surgery  [address], [address]  [place] [address]  Phone: [phone]  Fax: [phone]  Scheduled Appointment: 06/30/2025 10:00 AM

## 2025-06-18 NOTE — DISCHARGE NOTE NURSING/CASE MANAGEMENT/SOCIAL WORK - FINANCIAL ASSISTANCE
[facility] provides services at a reduced cost to those who are determined to be eligible through [facility]’s financial assistance program. Information regarding [facility]’s financial assistance program can be found by going to [email or web address] or by calling [phone].

## 2025-06-18 NOTE — DISCHARGE NOTE PROVIDER - HOSPITAL COURSE
This is a 69 year old female admitted to [facility] on June 17, 2025 for elective L4-S7cldkcbrwhau/Lumbar Fusion and L3-4 right fenestration by Dr Travon Morrow. PST performed at [facility]  including H & P, pre anesthesia screening, Pre-Op testing. Preoperative medical clearances were obtained.    Perioperative antibiotics given for infection prevention in accordance with SCIP criteria.  Neuromonitoring was done throughout the case. No complications occurred. Recovery in PACU was uneventful, the patient was then transferred to floor for acute post -operative surgical care.  Patient has a stable Post-Op course with no major medical complications. Post-op medical consultation was obtained with hospitalist service for medical comanagement. Post-Op labs followed by Ortho & Medical team.   Routine  PT and OT consult for Post Spine surgery modalities with the goal of increased mobility and independence.   Cyrus Charles for post-op for VTEP.   Diet was well tolerated post-op.  Lumbar incision site appeared clean with no signs of surgical site infection. Stable neuro exam of BLEs remained stable postoperatively.   No medical / hospital complications.  A follow up post op x-ray revealed no change to hardware position.  The patient was felt to benefit from further rehabilitative care for restoration to level of function/mobility. This was felt to best be accomplished in a home setting, which the patient aggreed with.   All discharge instructions reviewed. To follow up with surgeon & PMD  after discharge.   This is a 69 year old female admitted to [facility] on June 17, 2025 for elective L4-M0ofzdzrrkiwv/Lumbar Fusion and L3-4 right fenestration by Dr Travon Morrow. PST performed at [facility]  including H & P, pre anesthesia screening, Pre-Op testing. Preoperative medical clearances were obtained.    Perioperative antibiotics given for infection prevention in accordance with SCIP criteria.  Neuromonitoring was done throughout the case. No complications occurred. Recovery in PACU was uneventful, the patient was then transferred to floor for acute post -operative surgical care.  Patient has a stable Post-Op course with no major medical complications. Post-op medical consultation was obtained with hospitalist service for medical comanagement. Post-Op labs followed by Ortho & Medical team.   Routine  PT and OT consult for Post Spine surgery modalities with the goal of increased mobility and independence.   Cyrus Charles for post-op for VTEP.   Diet was well tolerated post-op.  Lumbar incision site appeared clean with no signs of surgical site infection. Stable neuro exam of BLEs remained stable postoperatively.   No medical / hospital complications.  A follow up post op x-ray revealed no change to hardware position.  The patient was felt to benefit from further rehabilitative care for restoration to level of function/mobility. This was felt to best be accomplished in a home setting, which the patient agreed with.   All discharge instructions reviewed. To follow up with surgeon & PMD  after discharge.

## 2025-06-18 NOTE — DISCHARGE NOTE PROVIDER - NSDCMRMEDTOKEN_GEN_ALL_CORE_FT
acetaminophen 500 mg oral tablet: 2 tab(s) orally every 8 hours  amLODIPine 10 mg oral tablet: 1 tab(s) orally once a day  Dilaudid 2 mg oral tablet: 1 tab(s) orally every 4 hours as needed for  moderate pain 1 tab by mouth as needed for moderate to severe postoperative pain up to every 4 hours.  [facility]#[id number] MDD: 6  ezetimibe 10 mg oral tablet: 1 tab(s) orally once a day  losartan 50 mg oral tablet: 1 tab(s) orally once a day  polyethylene glycol 3350 oral powder for reconstitution: 17 gram(s) orally once a day  senna leaf extract oral tablet: 2 tab(s) orally once a day (at bedtime)  simvastatin 20 mg oral tablet: 1 tab(s) orally once a day  tiZANidine 2 mg oral tablet: 2 tab(s) orally 3 times a day as needed for  muscle spasm   acetaminophen 500 mg oral tablet: 2 tab(s) orally every 8 hours  amLODIPine 10 mg oral tablet: 1 tab(s) orally once a day  diazePAM 2 mg oral tablet: 1 tab(s) orally every 8 hours as needed for back spasm Narcotic and valium can NOT be taken together there must be at least two hours between taking them MDD: 3  ezetimibe 10 mg oral tablet: 1 tab(s) orally once a day  losartan 50 mg oral tablet: 1 tab(s) orally once a day  Narcan 4 mg/0.1 mL nasal spray: 4 milligram(s) intranasally once as needed for narcotic overdose Instill one spray into nostril as needed for drug overdose. May repeat dose in alternate nostril if needed in 2-3 minutes while awaiting EMS  oxyCODONE 5 mg oral tablet: 1 tab(s) orally every 4 to 6 hours as needed for  moderate pain take with food MDD: 6  polyethylene glycol 3350 oral powder for reconstitution: 17 gram(s) orally once a day  senna leaf extract oral tablet: 2 tab(s) orally once a day (at bedtime)  simvastatin 20 mg oral tablet: 1 tab(s) orally once a day

## 2025-06-18 NOTE — DISCHARGE NOTE NURSING/CASE MANAGEMENT/SOCIAL WORK - NSDCPEFALRISK_GEN_ALL_CORE
For information on Fall & Injury Prevention, visit: [email or web address] OR  [email or web address] OR  https://www.cdc.gov/steadi/patient.html

## 2025-06-19 PROCEDURE — 99232 SBSQ HOSP IP/OBS MODERATE 35: CPT

## 2025-06-19 RX ORDER — ACETAMINOPHEN 500 MG/5ML
650 LIQUID (ML) ORAL EVERY 6 HOURS
Refills: 0 | Status: DISCONTINUED | OUTPATIENT
Start: 2025-06-19 | End: 2025-06-20

## 2025-06-19 RX ORDER — SODIUM CHLORIDE 9 G/1000ML
1000 INJECTION, SOLUTION INTRAVENOUS ONCE
Refills: 0 | Status: COMPLETED | OUTPATIENT
Start: 2025-06-19 | End: 2025-06-19

## 2025-06-19 RX ORDER — TRAMADOL HYDROCHLORIDE 50 MG/1
100 TABLET, FILM COATED ORAL EVERY 6 HOURS
Refills: 0 | Status: DISCONTINUED | OUTPATIENT
Start: 2025-06-19 | End: 2025-06-19

## 2025-06-19 RX ORDER — TRAMADOL HYDROCHLORIDE 50 MG/1
50 TABLET, FILM COATED ORAL EVERY 4 HOURS
Refills: 0 | Status: DISCONTINUED | OUTPATIENT
Start: 2025-06-19 | End: 2025-06-19

## 2025-06-19 RX ORDER — ACETAMINOPHEN 500 MG/5ML
1000 LIQUID (ML) ORAL ONCE
Refills: 0 | Status: COMPLETED | OUTPATIENT
Start: 2025-06-19 | End: 2025-06-19

## 2025-06-19 RX ORDER — TRAMADOL HYDROCHLORIDE 50 MG/1
50 TABLET, FILM COATED ORAL EVERY 12 HOURS
Refills: 0 | Status: DISCONTINUED | OUTPATIENT
Start: 2025-06-19 | End: 2025-06-20

## 2025-06-19 RX ADMIN — EZETIMIBE 10 MILLIGRAM(S): 10 TABLET ORAL at 21:15

## 2025-06-19 RX ADMIN — Medication 1000 MILLIGRAM(S): at 21:10

## 2025-06-19 RX ADMIN — Medication 2 MILLIGRAM(S): at 05:15

## 2025-06-19 RX ADMIN — ATORVASTATIN CALCIUM 10 MILLIGRAM(S): 80 TABLET, FILM COATED ORAL at 21:07

## 2025-06-19 RX ADMIN — METHOCARBAMOL 500 MILLIGRAM(S): 500 TABLET, FILM COATED ORAL at 21:07

## 2025-06-19 RX ADMIN — Medication 400 MILLIGRAM(S): at 13:37

## 2025-06-19 RX ADMIN — Medication 2 MILLIGRAM(S): at 04:33

## 2025-06-19 RX ADMIN — Medication 1000 MILLIGRAM(S): at 13:44

## 2025-06-19 RX ADMIN — TRAMADOL HYDROCHLORIDE 50 MILLIGRAM(S): 50 TABLET, FILM COATED ORAL at 20:30

## 2025-06-19 RX ADMIN — Medication 400 MILLIGRAM(S): at 21:03

## 2025-06-19 RX ADMIN — TRAMADOL HYDROCHLORIDE 50 MILLIGRAM(S): 50 TABLET, FILM COATED ORAL at 19:31

## 2025-06-19 RX ADMIN — Medication 1000 MILLIGRAM(S): at 06:33

## 2025-06-19 RX ADMIN — Medication 1000 MILLIGRAM(S): at 06:28

## 2025-06-19 RX ADMIN — Medication 2 TABLET(S): at 21:07

## 2025-06-19 RX ADMIN — SODIUM CHLORIDE 1000 MILLILITER(S): 9 INJECTION, SOLUTION INTRAVENOUS at 21:02

## 2025-06-20 VITALS
RESPIRATION RATE: 16 BRPM | TEMPERATURE: 99 F | HEART RATE: 72 BPM | DIASTOLIC BLOOD PRESSURE: 68 MMHG | SYSTOLIC BLOOD PRESSURE: 113 MMHG | OXYGEN SATURATION: 97 %

## 2025-06-20 LAB
ALBUMIN SERPL ELPH-MCNC: 2.7 G/DL — LOW (ref 3.3–5)
ALP SERPL-CCNC: 87 U/L — SIGNIFICANT CHANGE UP (ref 30–120)
ALT FLD-CCNC: 30 U/L — SIGNIFICANT CHANGE UP (ref 10–60)
ANION GAP SERPL CALC-SCNC: 6 MMOL/L — SIGNIFICANT CHANGE UP (ref 5–17)
AST SERPL-CCNC: 23 U/L — SIGNIFICANT CHANGE UP (ref 10–40)
BILIRUB SERPL-MCNC: 0.4 MG/DL — SIGNIFICANT CHANGE UP (ref 0.2–1.2)
BUN SERPL-MCNC: 9 MG/DL — SIGNIFICANT CHANGE UP (ref 7–23)
CALCIUM SERPL-MCNC: 8.7 MG/DL — SIGNIFICANT CHANGE UP (ref 8.4–10.5)
CHLORIDE SERPL-SCNC: 103 MMOL/L — SIGNIFICANT CHANGE UP (ref 96–108)
CO2 SERPL-SCNC: 30 MMOL/L — SIGNIFICANT CHANGE UP (ref 22–31)
CREAT SERPL-MCNC: 0.54 MG/DL — SIGNIFICANT CHANGE UP (ref 0.5–1.3)
EGFR: 100 ML/MIN/1.73M2 — SIGNIFICANT CHANGE UP
EGFR: 100 ML/MIN/1.73M2 — SIGNIFICANT CHANGE UP
GLUCOSE SERPL-MCNC: 101 MG/DL — HIGH (ref 70–99)
HCT VFR BLD CALC: 34.3 % — LOW (ref 34.5–45)
HGB BLD-MCNC: 10.8 G/DL — LOW (ref 11.5–15.5)
MCHC RBC-ENTMCNC: 26.2 PG — LOW (ref 27–34)
MCHC RBC-ENTMCNC: 31.5 G/DL — LOW (ref 32–36)
MCV RBC AUTO: 83.1 FL — SIGNIFICANT CHANGE UP (ref 80–100)
NRBC # BLD AUTO: 0 K/UL — SIGNIFICANT CHANGE UP (ref 0–0)
NRBC # FLD: 0 K/UL — SIGNIFICANT CHANGE UP (ref 0–0)
NRBC BLD AUTO-RTO: 0 /100 WBCS — SIGNIFICANT CHANGE UP (ref 0–0)
PLATELET # BLD AUTO: 275 K/UL — SIGNIFICANT CHANGE UP (ref 150–400)
PMV BLD: 10.3 FL — SIGNIFICANT CHANGE UP (ref 7–13)
POTASSIUM SERPL-MCNC: 3.5 MMOL/L — SIGNIFICANT CHANGE UP (ref 3.5–5.3)
POTASSIUM SERPL-SCNC: 3.5 MMOL/L — SIGNIFICANT CHANGE UP (ref 3.5–5.3)
PROT SERPL-MCNC: 6.2 G/DL — SIGNIFICANT CHANGE UP (ref 6–8.3)
RBC # BLD: 4.13 M/UL — SIGNIFICANT CHANGE UP (ref 3.8–5.2)
RBC # FLD: 13.5 % — SIGNIFICANT CHANGE UP (ref 10.3–14.5)
SODIUM SERPL-SCNC: 139 MMOL/L — SIGNIFICANT CHANGE UP (ref 135–145)
SURGICAL PATHOLOGY STUDY: SIGNIFICANT CHANGE UP
WBC # BLD: 10.87 K/UL — HIGH (ref 3.8–10.5)
WBC # FLD AUTO: 10.87 K/UL — HIGH (ref 3.8–10.5)

## 2025-06-20 PROCEDURE — 85025 COMPLETE CBC W/AUTO DIFF WBC: CPT

## 2025-06-20 PROCEDURE — 85027 COMPLETE CBC AUTOMATED: CPT

## 2025-06-20 PROCEDURE — 36415 COLL VENOUS BLD VENIPUNCTURE: CPT

## 2025-06-20 PROCEDURE — 88304 TISSUE EXAM BY PATHOLOGIST: CPT

## 2025-06-20 PROCEDURE — 99232 SBSQ HOSP IP/OBS MODERATE 35: CPT

## 2025-06-20 PROCEDURE — 97116 GAIT TRAINING THERAPY: CPT

## 2025-06-20 PROCEDURE — 97530 THERAPEUTIC ACTIVITIES: CPT

## 2025-06-20 PROCEDURE — 72100 X-RAY EXAM L-S SPINE 2/3 VWS: CPT

## 2025-06-20 PROCEDURE — 80053 COMPREHEN METABOLIC PANEL: CPT

## 2025-06-20 PROCEDURE — 97161 PT EVAL LOW COMPLEX 20 MIN: CPT

## 2025-06-20 PROCEDURE — C1713: CPT

## 2025-06-20 PROCEDURE — 88311 DECALCIFY TISSUE: CPT

## 2025-06-20 PROCEDURE — 76000 FLUOROSCOPY <1 HR PHYS/QHP: CPT

## 2025-06-20 PROCEDURE — C1889: CPT

## 2025-06-20 PROCEDURE — 97165 OT EVAL LOW COMPLEX 30 MIN: CPT

## 2025-06-20 PROCEDURE — 80048 BASIC METABOLIC PNL TOTAL CA: CPT

## 2025-06-20 RX ORDER — OXYCODONE HYDROCHLORIDE 30 MG/1
1 TABLET ORAL
Qty: 42 | Refills: 0
Start: 2025-06-20 | End: 2025-06-26

## 2025-06-20 RX ORDER — NALOXONE HYDROCHLORIDE 0.4 MG/ML
4 INJECTION, SOLUTION INTRAMUSCULAR; INTRAVENOUS; SUBCUTANEOUS
Qty: 1 | Refills: 0
Start: 2025-06-20

## 2025-06-20 RX ORDER — DIAZEPAM 5 MG/1
2 TABLET ORAL EVERY 8 HOURS
Refills: 0 | Status: DISCONTINUED | OUTPATIENT
Start: 2025-06-20 | End: 2025-06-20

## 2025-06-20 RX ORDER — HYDROMORPHONE/SOD CHLOR,ISO/PF 2 MG/10 ML
0.5 SYRINGE (ML) INJECTION ONCE
Refills: 0 | Status: DISCONTINUED | OUTPATIENT
Start: 2025-06-20 | End: 2025-06-20

## 2025-06-20 RX ORDER — ACETAMINOPHEN 500 MG/5ML
1000 LIQUID (ML) ORAL ONCE
Refills: 0 | Status: COMPLETED | OUTPATIENT
Start: 2025-06-20 | End: 2025-06-20

## 2025-06-20 RX ORDER — ACETAMINOPHEN 500 MG/5ML
1000 LIQUID (ML) ORAL EVERY 6 HOURS
Refills: 0 | Status: DISCONTINUED | OUTPATIENT
Start: 2025-06-20 | End: 2025-06-20

## 2025-06-20 RX ORDER — DIAZEPAM 5 MG/1
1 TABLET ORAL
Qty: 42 | Refills: 0
Start: 2025-06-20 | End: 2025-07-03

## 2025-06-20 RX ORDER — SODIUM CHLORIDE 9 G/1000ML
500 INJECTION, SOLUTION INTRAVENOUS ONCE
Refills: 0 | Status: COMPLETED | OUTPATIENT
Start: 2025-06-20 | End: 2025-06-20

## 2025-06-20 RX ADMIN — EZETIMIBE 10 MILLIGRAM(S): 10 TABLET ORAL at 08:15

## 2025-06-20 RX ADMIN — TRAMADOL HYDROCHLORIDE 50 MILLIGRAM(S): 50 TABLET, FILM COATED ORAL at 05:20

## 2025-06-20 RX ADMIN — Medication 0.5 MILLIGRAM(S): at 02:27

## 2025-06-20 RX ADMIN — SODIUM CHLORIDE 1000 MILLILITER(S): 9 INJECTION, SOLUTION INTRAVENOUS at 02:14

## 2025-06-20 RX ADMIN — TRAMADOL HYDROCHLORIDE 50 MILLIGRAM(S): 50 TABLET, FILM COATED ORAL at 05:19

## 2025-06-20 RX ADMIN — Medication 400 MILLIGRAM(S): at 06:39

## 2025-06-20 RX ADMIN — Medication 0.5 MILLIGRAM(S): at 02:13

## 2025-06-20 RX ADMIN — DIAZEPAM 2 MILLIGRAM(S): 5 TABLET ORAL at 16:21

## 2025-06-20 RX ADMIN — DIAZEPAM 2 MILLIGRAM(S): 5 TABLET ORAL at 08:15

## 2025-06-20 RX ADMIN — Medication 1000 MILLIGRAM(S): at 06:59

## 2025-06-20 RX ADMIN — POLYETHYLENE GLYCOL 3350 17 GRAM(S): 17 POWDER, FOR SOLUTION ORAL at 11:45

## 2025-06-20 RX ADMIN — METHOCARBAMOL 500 MILLIGRAM(S): 500 TABLET, FILM COATED ORAL at 05:19

## 2025-06-20 NOTE — CASE MANAGEMENT PROGRESS NOTE - NSCMPROGRESSNOTE_GEN_ALL_CORE
Patient cleared for discharge today. Patient feels well and is agreeable with discharge. [id number] delivered rolling walker to bedside and patients  is at bedside and transport her home ans assit as needed at home.

## 2025-06-20 NOTE — PROGRESS NOTE ADULT - REASON FOR ADMISSION
ow back and lower extremity pain
Low back and lower extremity pain
Low back and lower extremity pain
Lumbar Fusion
s/p two level lumbar Fusion

## 2025-06-20 NOTE — PROGRESS NOTE ADULT - PROVIDER SPECIALTY LIST ADULT
Hospitalist
Orthopedics
Orthopedics
Pharmacy
Hospitalist
Hospitalist
Orthopedics

## 2025-06-20 NOTE — PROGRESS NOTE ADULT - SUBJECTIVE AND OBJECTIVE BOX
POD#  s/p Laminectomy   Fusion   L4-S1 with L3-4 right fenestration  Patient is a 69yFemale  Complaining of surgical site pain mild to moderate resting in bed, no radiation to extremities    No Headache  No Nausea or vomitting  No SOB  No Chest Pain  Vital Signs Last 24 Hrs  T(C): 36.4 (18 Jun 2025 07:40), Max: 36.4 (17 Jun 2025 12:56)  T(F): 97.6 (18 Jun 2025 07:40), Max: 97.6 (17 Jun 2025 12:56)  HR: 70 (18 Jun 2025 07:40) (61 - 87)  BP: 119/64 (18 Jun 2025 07:40) (94/55 - 119/64)  BP(mean): --  RR: 18 (18 Jun 2025 07:40) (11 - 19)  SpO2: 96% (18 Jun 2025 07:40) (92% - 100%)    Parameters below as of 18 Jun 2025 07:40  Patient On (Oxygen Delivery Method): room air      CBC Full  -  ( 18 Jun 2025 06:15 )  WBC Count : 13.06 K/uL  RBC Count : 4.73 M/uL  Hemoglobin : 12.3 g/dL  Hematocrit : 38.6 %  Platelet Count - Automated : 324 K/uL  Mean Cell Volume : 81.6 fl  Mean Cell Hemoglobin : 26.0 pg  Mean Cell Hemoglobin Concentration : 31.9 g/dL  Auto Neutrophil # : 11.66 K/uL  Auto Lymphocyte # : 1.00 K/uL  Auto Monocyte # : 0.34 K/uL  Auto Eosinophil # : 0.00 K/uL  Auto Basophil # : 0.01 K/uL  Auto Neutrophil % : 89.2 %  Auto Lymphocyte % : 7.7 %  Auto Monocyte % : 2.6 %  Auto Eosinophil % : 0.0 %  Auto Basophil % : 0.1 %        I&O's Detail    17 Jun 2025 07:01  -  18 Jun 2025 07:00  --------------------------------------------------------  IN:    Lactated Ringers: 1370 mL    Lactated Ringers: 1100 mL  Total IN: 2470 mL    OUT:    Accordian (mL): 240 mL, 90ml overnight    Blood Loss (mL): 300 mL    Voided (mL): 2750 mL  Total OUT: 3290 mL    Total NET: -820 mL        MEDICATIONS  (STANDING):  acetaminophen     Tablet .. 1000 milliGRAM(s) Oral every 8 hours  atorvastatin 10 milliGRAM(s) Oral at bedtime  ezetimibe 10 milliGRAM(s) Oral daily  lactated ringers. 1000 milliLiter(s) (100 mL/Hr) IV Continuous <Continuous>  polyethylene glycol 3350 17 Gram(s) Oral daily  senna 2 Tablet(s) Oral at bedtime    MEDICATIONS  (PRN):  aluminum hydroxide/magnesium hydroxide/simethicone Suspension 30 milliLiter(s) Oral every 12 hours PRN Indigestion  HYDROmorphone   Tablet 2 milliGRAM(s) Oral every 3 hours PRN Moderate Pain (4 - 6)  HYDROmorphone   Tablet 4 milliGRAM(s) Oral every 3 hours PRN Severe Pain (7 - 10)  HYDROmorphone  Injectable 0.5 milliGRAM(s) IV Push every 3 hours PRN Breakthrough pain  methocarbamol 500 milliGRAM(s) Oral every 8 hours PRN Muscle Spasm  ondansetron Injectable 4 milliGRAM(s) IV Push every 6 hours PRN Nausea and/or Vomiting      Surgical site dressing clean, dry    Neuro- EHL present, Plantar flexion present, Dorsiflexion present, Quads present  Vasc- +pedal pulses, calves soft nontender    Assessment and Plan    Stable s/p Laminectomy   Fusion   L4-S1 with L3-4 right fenestration   Analgesia-prn Dilaudid, methocarbamol. Ice pack.  Scheduled acetaminophen  VTEP-PAS  PT/OT-low back protocols  Monitor hemovac output, remove when less than 30ml over 8 hours  Plan for discharge to home when orthopedically cleared (hemovac output), cleared by  PT/OT and medicine       
  POD#  s/p Laminectomy   Fusion  L4-S1 with L3-4 right fenestration  Patient is a 69yFemale  Complaining of surgical site pain moderate to severe, no radiation to extremities  Was lightheaded when OOB to bathroom last PM  No Headache  No Nausea or vomitting  No SOB  No Chest Pain  Vital Signs Last 24 Hrs  T(C): 36.5 (19 Jun 2025 06:26), Max: 36.8 (18 Jun 2025 16:00)  T(F): 97.7 (19 Jun 2025 06:26), Max: 98.2 (18 Jun 2025 16:00)  HR: 61 (19 Jun 2025 06:26) (61 - 71)  BP: 118/79 (19 Jun 2025 06:26) (108/66 - 124/74)  BP(mean): --  RR: 16 (19 Jun 2025 06:26) (16 - 18)  SpO2: 99% (19 Jun 2025 06:26) (96% - 99%)    Parameters below as of 19 Jun 2025 06:26  Patient On (Oxygen Delivery Method): room air      CBC Full  -  ( 18 Jun 2025 06:15 )  WBC Count : 13.06 K/uL  RBC Count : 4.73 M/uL  Hemoglobin : 12.3 g/dL  Hematocrit : 38.6 %  Platelet Count - Automated : 324 K/uL  Mean Cell Volume : 81.6 fl  Mean Cell Hemoglobin : 26.0 pg  Mean Cell Hemoglobin Concentration : 31.9 g/dL  Auto Neutrophil # : 11.66 K/uL  Auto Lymphocyte # : 1.00 K/uL  Auto Monocyte # : 0.34 K/uL  Auto Eosinophil # : 0.00 K/uL  Auto Basophil # : 0.01 K/uL  Auto Neutrophil % : 89.2 %  Auto Lymphocyte % : 7.7 %  Auto Monocyte % : 2.6 %  Auto Eosinophil % : 0.0 %  Auto Basophil % : 0.1 %  06-18    142  |  105  |  9   ----------------------------<  128[H]  3.8   |  27  |  0.49[L]    Ca    9.4      18 Jun 2025 06:15      I&O's Detail    18 Jun 2025 07:01  -  19 Jun 2025 07:00  --------------------------------------------------------  IN:  Total IN: 0 mL    OUT:    Accordian (mL): 180 mL  Total OUT: 180 mL    Total NET: -180 mL        MEDICATIONS  (STANDING):  acetaminophen     Tablet .. 1000 milliGRAM(s) Oral every 8 hours  amLODIPine   Tablet 10 milliGRAM(s) Oral daily  atorvastatin 10 milliGRAM(s) Oral at bedtime  ezetimibe 10 milliGRAM(s) Oral daily  lactated ringers. 1000 milliLiter(s) (100 mL/Hr) IV Continuous <Continuous>  losartan 50 milliGRAM(s) Oral daily  polyethylene glycol 3350 17 Gram(s) Oral daily  senna 2 Tablet(s) Oral at bedtime    MEDICATIONS  (PRN):  aluminum hydroxide/magnesium hydroxide/simethicone Suspension 30 milliLiter(s) Oral every 12 hours PRN Indigestion  HYDROmorphone   Tablet 2 milliGRAM(s) Oral every 3 hours PRN Moderate Pain (4 - 6)  HYDROmorphone   Tablet 4 milliGRAM(s) Oral every 3 hours PRN Severe Pain (7 - 10)  HYDROmorphone  Injectable 0.5 milliGRAM(s) IV Push every 3 hours PRN Breakthrough pain  methocarbamol 500 milliGRAM(s) Oral every 8 hours PRN Muscle Spasm  ondansetron Injectable 4 milliGRAM(s) IV Push every 6 hours PRN Nausea and/or Vomiting      Surgical site dressing clean, dry  Hemovac 60 ml overnight    Neuro- EHL present, Plantar flexion present, Dorsiflexion present, Quads present  Vasc-  calves soft nontender,  +pedal pulses  Assessment and Plan    Analgesia-discussed, will continue with current regimen  VTEP- PAS   PT/OT- continue with low back protocols, fall precautions  Plan for removal of hemovac, discharge to home when cleared and stable          
Patient is a 69y old  Female who presents with a chief complaint of Low back and lower extremity pain (18 Jun 2025 08:22)      INTERVAL HPI/OVERNIGHT EVENTS:    no overnight events    MEDICATIONS  (STANDING):  acetaminophen     Tablet .. 1000 milliGRAM(s) Oral every 8 hours  atorvastatin 10 milliGRAM(s) Oral at bedtime  ezetimibe 10 milliGRAM(s) Oral daily  lactated ringers. 1000 milliLiter(s) (100 mL/Hr) IV Continuous <Continuous>  polyethylene glycol 3350 17 Gram(s) Oral daily  senna 2 Tablet(s) Oral at bedtime    MEDICATIONS  (PRN):  aluminum hydroxide/magnesium hydroxide/simethicone Suspension 30 milliLiter(s) Oral every 12 hours PRN Indigestion  HYDROmorphone   Tablet 2 milliGRAM(s) Oral every 3 hours PRN Moderate Pain (4 - 6)  HYDROmorphone   Tablet 4 milliGRAM(s) Oral every 3 hours PRN Severe Pain (7 - 10)  HYDROmorphone  Injectable 0.5 milliGRAM(s) IV Push every 3 hours PRN Breakthrough pain  methocarbamol 500 milliGRAM(s) Oral every 8 hours PRN Muscle Spasm  ondansetron Injectable 4 milliGRAM(s) IV Push every 6 hours PRN Nausea and/or Vomiting      Allergies    No Known Allergies    Intolerances        REVIEW OF SYSTEMS:  CONSTITUTIONAL: No fever, weight loss, or fatigue  EYES: No eye pain, visual disturbances, or discharge  ENMT:  No difficulty hearing, tinnitus, vertigo; No sinus or throat pain  NECK: No pain or stiffness  BREASTS: No pain, masses, or nipple discharge  RESPIRATORY: No cough, wheezing, chills or hemoptysis; No shortness of breath  CARDIOVASCULAR: No chest pain, palpitations, lightheadedness, or leg swelling  GASTROINTESTINAL: No abdominal or epigastric pain. No nausea, vomiting, or hematemesis; No diarrhea or constipation. No melena or hematochezia.  GENITOURINARY: No dysuria, frequency, hematuria, or incontinence  NEUROLOGICAL: No headaches, memory loss, vertigo, loss of strength, numbness, or tremors  SKIN: No itching, burning, rashes, or lesions   LYMPH NODES: No enlarged glands  ENDOCRINE: No heat or cold intolerance; No hair loss; No polydipsia or polyuria  MUSCULOSKELETAL: No joint pain or swelling; No muscle, back, or extremity pain  PSYCHIATRIC: No depression, anxiety, or mood swings  HEME/LYMPH: No easy bruising, or bleeding gums  ALLERGY AND IMMUNOLOGIC: No hives or eczema    Vital Signs Last 24 Hrs  T(C): 36.4 (18 Jun 2025 07:40), Max: 36.4 (17 Jun 2025 12:56)  T(F): 97.6 (18 Jun 2025 07:40), Max: 97.6 (17 Jun 2025 12:56)  HR: 70 (18 Jun 2025 07:40) (61 - 87)  BP: 119/64 (18 Jun 2025 07:40) (94/55 - 119/64)  BP(mean): --  RR: 18 (18 Jun 2025 07:40) (11 - 19)  SpO2: 96% (18 Jun 2025 07:40) (92% - 100%)    Parameters below as of 18 Jun 2025 07:40  Patient On (Oxygen Delivery Method): room air        PHYSICAL EXAM:  GENERAL: NAD, well-groomed, well-developed  HEAD:  Atraumatic, Normocephalic  EYES: EOMI, PERRLA, conjunctiva and sclera clear  ENMT: Moist mucous membranes, No lesions; No tonsillar erythema, exudates, or enlargement  NECK: Supple, No JVD, Normal thyroid  NERVOUS SYSTEM:  Alert & Oriented X3, Good concentration; All 4 extremities mobile, no gross sensory deficits.   CHEST/LUNG: Clear to auscultation bilaterally; No rales, rhonchi, wheezing, or rubs  HEART: Regular rate and rhythm; No murmurs, rubs, or gallops  ABDOMEN: Soft, Nontender, Nondistended; Bowel sounds present  EXTREMITIES:  2+ Peripheral Pulses, No clubbing, cyanosis, or edema  LYMPH: No lymphadenopathy noted  SKIN: No rashes or lesions    LABS:                        12.3   13.06 )-----------( 324      ( 18 Jun 2025 06:15 )             38.6     18 Jun 2025 06:15    142    |  105    |  9      ----------------------------<  128    3.8     |  27     |  0.49     Ca    9.4        18 Jun 2025 06:15        Urinalysis Basic - ( 18 Jun 2025 06:15 )    Color: x / Appearance: x / SG: x / pH: x  Gluc: 128 mg/dL / Ketone: x  / Bili: x / Urobili: x   Blood: x / Protein: x / Nitrite: x   Leuk Esterase: x / RBC: x / WBC x   Sq Epi: x / Non Sq Epi: x / Bacteria: x      CAPILLARY BLOOD GLUCOSE          RADIOLOGY & ADDITIONAL TESTS:    
SUBJECTIVE: Patient seen and examined. No nausea/vomiting, nor shortness of breath. Patient is not happy with the new pain protocol from last night to tramadol 50mg and tylenol 650mg. Spoke with Dr Morrow with some suggestions of trying oxycodone and going back to 1000mg tylenol    OBJECTIVE:     Vital Signs Last 24 Hrs  T(C): 36.9 (20 Jun 2025 05:15), Max: 37 (19 Jun 2025 23:30)  T(F): 98.5 (20 Jun 2025 05:15), Max: 98.6 (19 Jun 2025 23:30)  HR: 71 (20 Jun 2025 05:15) (62 - 79)  BP: 110/69 (20 Jun 2025 05:15) (103/67 - 116/66)  BP(mean): --  RR: 16 (20 Jun 2025 05:15) (16 - 18)  SpO2: 95% (20 Jun 2025 05:15) (95% - 97%)    Parameters below as of 20 Jun 2025 05:15  Patient On (Oxygen Delivery Method): room air        PAIN SCORE:         SCALE USED: (1-10 VNRS)        Affected extremity:          Dressing: clean/dry/intact  dressing that was changed yesterday         Sensation:  intact to bilateral feet         Motor exam:          5/ 5 Tibialis Anterior/Gastrocnemius-Soleus , EHL         warm well perfused; capillary refill <3 seconds     LABS:            CAPILLARY BLOOD GLUCOSE          MEDICATIONS:    Anticoagulation: pas and PT with walking      Antibiotics:       Pain medications:   ondansetron Injectable 4 milliGRAM(s) IV Push every 6 hours PRN      A/P :  s/pLumbar fusion  POD # 3  -    Pain control  -    DVT ppx: pas and to walk   -    No AM labs to check and nor to be drawn  -    Physical Therapy  -    Dispo: Home 
Discharge medication calendar:  APAP 1000mg q8h x 2-3 weeks  Narcotic PRN  Docusate 100mg TID while taking narcotic  Miralax, Senna, or Bisacodyl PRN for treatment of constipation  Tizanidine as needed
Hemovac drain removed. Primary dressing removed.  No active drainage noted. Steristrips in place . No erythema.    DSD placed.   Abdominal binder applied.   Plan for continued PT/OT , monitor hemodynamics.  Anticipate discharge to home tomorrow. 
POST OPERATIVE DAY #:  [2 ]   STATUS POST: [Decompressive laminectomy and fusion L 3 to S 1 ]                       SUBJECTIVE: Patient seen and examined [ c/o incisiONAL PAIN; no pre op radicluopathy   ]     Pain (0-10): 10    OBJECTIVE:   Vital Signs Last 24 Hrs  T(C): 36.7 (19 Jun 2025 07:35), Max: 36.8 (18 Jun 2025 16:00)  T(F): 98.1 (19 Jun 2025 07:35), Max: 98.2 (18 Jun 2025 16:00)  HR: 62 (19 Jun 2025 07:35) (61 - 71)  BP: 114/69 (19 Jun 2025 07:35) (108/66 - 124/74)  RR: 18 (19 Jun 2025 07:35) (16 - 18)  SpO2: 97% (19 Jun 2025 07:35) (96% - 99%)    Parameters below as of 19 Jun 2025 07:35  Patient On (Oxygen Delivery Method): room air    \par Constitutional: Pleasant in no acute distress  Psych:A&Ox3      Spine:          Dressing: [ ] clean/dry/intact  [ ] Other:           Drains:  drains clamped; please unclamp at 1 pm to monitor output         Sensation: [x ]          [ ] Upper extremity                ax        mc           m          u          r                                                         R          +           +             +           +          +                                               L           +           +             +           +          +         [x ] Lower extremity             sp         dp         saph       row[place]         tibial                                                R          +           +             +           +          +                                               L           +           +             +           +          +                Motor exam: [x  ]          [ ] Upper extremity              Bi(c5)  WE(c6)  EE(c7)   FF(c8)                                                R         5/5        5/5        5/5       5/5                                               L          5/5        5/5        5/5       5/5         [x ] Lower extremity          HF(l2)   KE(l3)    TA(l4)   EHL(l5)  GS(s1)                                                 R        5/5        5/5        5/5       5/5         5/5                                               L         5/5        5/5       5/5       5/5          5/5                                                        x[ ] warm well perfused; capillary refill <3 seconds                                 LABS:                        12.3   13.06 )-----------( 324      ( 18 Jun 2025 06:15 )             38.6         RADIOLOGY & ADDITIONAL STUDIES: Xrays show implants in good position; no signs of hardware failure      A/P :  69y Female S/P    lumbar fusion     POD# 2  -    Pain control  -    DVT ppx: [x ]SCDs[ ] Pharmacologic    -    Periop abx:  Ancef [x ]  Vanco [ ]  Clindamycin   -    ADAT  -    Check AM labs    -    Monitor Drain Output     -    Resume home meds  -    Physical Therapy  -    Weight bearing status: WBAT [ x]        PWB    [ ]     TTWB  [ ]      NWB  [ ]  -    Dispo: Home [x ]     Acute Rehab [ ]      ALTAF [ ]          Continue P.T.; patient requires additional instruction in stair climbing; anticipate d/c of drain today; consider ALTAF if patient failure stair climbing today.
SUBJECTIVE: Post operative note s/p Lumbar Fusion. Patient seen and examined. No nausea/vomiting, nor shortness of breath. Prior to surgery patient with left leg radiculopathy and weakness.    OBJECTIVE:     Vital Signs Last 24 Hrs  T(C): 36.4 (17 Jun 2025 12:56), Max: 36.4 (17 Jun 2025 12:56)  T(F): 97.6 (17 Jun 2025 12:56), Max: 97.6 (17 Jun 2025 12:56)  HR: 65 (17 Jun 2025 14:11) (65 - 97)  BP: 97/58 (17 Jun 2025 14:11) (94/55 - 153/84)  BP(mean): --  RR: 11 (17 Jun 2025 14:11) (11 - 19)  SpO2: 95% (17 Jun 2025 14:11) (92% - 98%)    Parameters below as of 17 Jun 2025 14:11  Patient On (Oxygen Delivery Method): nasal cannula  O2 Flow (L/min): 2      PAIN SCORE:         SCALE USED: (1-10 VNRS)        Affected extremity:          Dressing: clean/dry/intact ABD dressing. Hemovac with 50cc drained in PACU            Sensation:  light touch is noted to bilateral feet both medially and laterally.         Motor exam:          5/ 5 Tibialis Anterior/Gastrocnemius-Soleus , EHL         warm well perfused; capillary refill <3 seconds     LABS: to be drawn in the AM    MEDICATIONS:    Anticoagulation: none only PAS to bilateral feet      Antibiotics: Ancef      Pain medications:   HYDROmorphone  Injectable 0.2 milliGRAM(s) IV Push every 10 minutes PRN  HYDROmorphone  Injectable 0.5 milliGRAM(s) IV Push every 10 minutes PRN  ondansetron Injectable 4 milliGRAM(s) IV Push once PRN  oxyCODONE    IR 5 milliGRAM(s) Oral once PRN      A/P :  s/p Lumbar Fusion POD#0  -    Pain control  -    DVT ppx:  PAS to bilateral LEs  -    Check AM labs  -    Weight bearing status: WBAT   -    Physical Therapy to evaluate and treat  -    Dispo: Home after cleared by Hospitalist and Physical therapy
Patient is a 69y old  Female who presents with a chief complaint of Low back and lower extremity pain (19 Jun 2025 07:06)      INTERVAL HPI/OVERNIGHT EVENTS:    had dilaudid this morning around 4,     she feels dizzy from it    vitals stable    advised her we will change to tramadol at night and for now give IV tylenol until dilaudid wears off from her system.     ortho PA Parag at bedside as well and agreees to plan    MEDICATIONS  (STANDING):  acetaminophen     Tablet .. 1000 milliGRAM(s) Oral every 8 hours  amLODIPine   Tablet 10 milliGRAM(s) Oral daily  atorvastatin 10 milliGRAM(s) Oral at bedtime  ezetimibe 10 milliGRAM(s) Oral daily  lactated ringers. 1000 milliLiter(s) (100 mL/Hr) IV Continuous <Continuous>  losartan 50 milliGRAM(s) Oral daily  polyethylene glycol 3350 17 Gram(s) Oral daily  senna 2 Tablet(s) Oral at bedtime    MEDICATIONS  (PRN):  aluminum hydroxide/magnesium hydroxide/simethicone Suspension 30 milliLiter(s) Oral every 12 hours PRN Indigestion  HYDROmorphone  Injectable 0.5 milliGRAM(s) IV Push every 3 hours PRN Breakthrough pain  methocarbamol 500 milliGRAM(s) Oral every 8 hours PRN Muscle Spasm  ondansetron Injectable 4 milliGRAM(s) IV Push every 6 hours PRN Nausea and/or Vomiting  traMADol 50 milliGRAM(s) Oral every 4 hours PRN Moderate Pain (4 - 6)  traMADol 100 milliGRAM(s) Oral every 6 hours PRN Severe Pain (7 - 10)      Allergies    No Known Allergies    Intolerances        REVIEW OF SYSTEMS:  as above    Vital Signs Last 24 Hrs  T(C): 36.7 (19 Jun 2025 07:35), Max: 36.8 (18 Jun 2025 16:00)  T(F): 98.1 (19 Jun 2025 07:35), Max: 98.2 (18 Jun 2025 16:00)  HR: 64 (19 Jun 2025 10:39) (61 - 69)  BP: 116/66 (19 Jun 2025 10:39) (108/66 - 124/74)  BP(mean): --  RR: 18 (19 Jun 2025 07:35) (16 - 18)  SpO2: 97% (19 Jun 2025 10:39) (96% - 99%)    Parameters below as of 19 Jun 2025 10:39  Patient On (Oxygen Delivery Method): room air        PHYSICAL EXAM:  GENERAL: NAD, well-groomed, well-developed  HEAD:  Atraumatic, Normocephalic  EYES: EOMI, PERRLA, conjunctiva and sclera clear  ENMT: Moist mucous membranes, No lesions; No tonsillar erythema, exudates, or enlargement  NECK: Supple, No JVD, Normal thyroid  NERVOUS SYSTEM:  Alert & Oriented X3, Good concentration; All 4 extremities mobile, no gross sensory deficits.   CHEST/LUNG: Clear to auscultation bilaterally; No rales, rhonchi, wheezing, or rubs  HEART: Regular rate and rhythm; No murmurs, rubs, or gallops  ABDOMEN: Soft, Nontender, Nondistended; Bowel sounds present  EXTREMITIES:  2+ Peripheral Pulses, No clubbing, cyanosis, or edema  LYMPH: No lymphadenopathy noted  SKIN: No rashes or lesions    LABS:      Ca    9.4        18 Jun 2025 06:15        Urinalysis Basic - ( 18 Jun 2025 06:15 )    Color: x / Appearance: x / SG: x / pH: x  Gluc: 128 mg/dL / Ketone: x  / Bili: x / Urobili: x   Blood: x / Protein: x / Nitrite: x   Leuk Esterase: x / RBC: x / WBC x   Sq Epi: x / Non Sq Epi: x / Bacteria: x      CAPILLARY BLOOD GLUCOSE          RADIOLOGY & ADDITIONAL TESTS:    
Patient is a 69y old  Female who presents with a chief complaint of s/p two level lumbar Fusion (20 Jun 2025 07:24)      INTERVAL HPI/OVERNIGHT EVENTS:    patient isn't feeling dizzy anymore but tramadol last night didn't help    Dr. Morrow and Lauri Roberts switched to 1g TID of tylenol and percocet.       MEDICATIONS  (STANDING):  amLODIPine   Tablet 10 milliGRAM(s) Oral daily  atorvastatin 10 milliGRAM(s) Oral at bedtime  ezetimibe 10 milliGRAM(s) Oral daily  lactated ringers. 1000 milliLiter(s) (100 mL/Hr) IV Continuous <Continuous>  losartan 50 milliGRAM(s) Oral daily  polyethylene glycol 3350 17 Gram(s) Oral daily  senna 2 Tablet(s) Oral at bedtime    MEDICATIONS  (PRN):  acetaminophen     Tablet .. 1000 milliGRAM(s) Oral every 6 hours PRN Mild Pain (1 - 3)  aluminum hydroxide/magnesium hydroxide/simethicone Suspension 30 milliLiter(s) Oral every 12 hours PRN Indigestion  diazepam    Tablet 2 milliGRAM(s) Oral every 8 hours PRN back spasm  ondansetron Injectable 4 milliGRAM(s) IV Push every 6 hours PRN Nausea and/or Vomiting  oxycodone    5 mG/acetaminophen 325 mG 1 Tablet(s) Oral every 4 hours PRN Moderate Pain (4 - 6)      Allergies    No Known Allergies    Intolerances        REVIEW OF SYSTEMS:  as above    Vital Signs Last 24 Hrs  T(C): 36.8 (20 Jun 2025 07:54), Max: 37 (19 Jun 2025 23:30)  T(F): 98.2 (20 Jun 2025 07:54), Max: 98.6 (19 Jun 2025 23:30)  HR: 70 (20 Jun 2025 07:54) (64 - 79)  BP: 110/66 (20 Jun 2025 07:54) (103/67 - 110/69)  BP(mean): 81 (20 Jun 2025 07:54) (81 - 81)  RR: 16 (20 Jun 2025 07:54) (16 - 16)  SpO2: 98% (20 Jun 2025 07:54) (95% - 98%)    Parameters below as of 20 Jun 2025 07:54  Patient On (Oxygen Delivery Method): room air        PHYSICAL EXAM:  GENERAL: NAD, well-groomed, well-developed  HEAD:  Atraumatic, Normocephalic  EYES: EOMI, PERRLA, conjunctiva and sclera clear  ENMT: Moist mucous membranes, No lesions; No tonsillar erythema, exudates, or enlargement  NECK: Supple, No JVD, Normal thyroid  NERVOUS SYSTEM:  Alert & Oriented X3, Good concentration; All 4 extremities mobile, no gross sensory deficits.   CHEST/LUNG: Clear to auscultation bilaterally; No rales, rhonchi, wheezing, or rubs  HEART: Regular rate and rhythm; No murmurs, rubs, or gallops  ABDOMEN: Soft, Nontender, Nondistended; Bowel sounds present  EXTREMITIES:  2+ Peripheral Pulses, No clubbing, cyanosis, or edema      LABS:                        10.8   10.87 )-----------( 275      ( 20 Jun 2025 06:15 )             34.3     20 Jun 2025 06:15    139    |  103    |  9      ----------------------------<  101    3.5     |  30     |  0.54     Ca    8.7        20 Jun 2025 06:15    TPro  6.2    /  Alb  2.7    /  TBili  0.4    /  DBili  x      /  AST  23     /  ALT  30     /  AlkPhos  87     20 Jun 2025 06:15      Urinalysis Basic - ( 20 Jun 2025 06:15 )    Color: x / Appearance: x / SG: x / pH: x  Gluc: 101 mg/dL / Ketone: x  / Bili: x / Urobili: x   Blood: x / Protein: x / Nitrite: x   Leuk Esterase: x / RBC: x / WBC x   Sq Epi: x / Non Sq Epi: x / Bacteria: x      CAPILLARY BLOOD GLUCOSE          RADIOLOGY & ADDITIONAL TESTS:    
0 = independent

## 2025-06-20 NOTE — PROGRESS NOTE ADULT - ASSESSMENT
69F with HTN, HLD, lumbar radiculopathy who has been suffering with back pain for years.      Lumbar surgery for lumbar radiculopathy - Laminectomy, spine, lumbar, with interlaminar lumbar instrumented fusion with L3-4 right fenestration  - POD #2    - reviewed patient's medical clearance and other outpatient notes  - post-op care as per ortho  - no DVT AC secondary to spine surgery  - pain control with standing tylenol 1000mg, dilaudid 2mg and 4mg for mod/severe respectively pain, dilaudid 0.5mg IV for breakthrough pain  - start robaxin PRN muscle spasms  - PT/ OT  - advance diet as tolerated  - anti-emetics PRN  - incentive spirometer  - bowel regimen  labs reviewed and trended  medically optimized for dc      #Dizziness  improved after dc'ed dilaudid    #Pain  changed dilaudid to tramadol, didn't improve pain  now on percocet and tylenol TID    HTN/HLD  - restart amlodipine on POD #2  - cont ezetimibe  - restart losartan 50mg on POD #2  - cont simvastatin    Preventive measures  - no AC secondary to spine surgery  - SCD only      
69F with HTN, HLD, lumbar radiculopathy who has been suffering with back pain for years.      Lumbar surgery for lumbar radiculopathy - Laminectomy, spine, lumbar, with interlaminar lumbar instrumented fusion with L3-4 right fenestration  - POD #0    - reviewed patient's medical clearance and other outpatient notes  - post-op care as per ortho  - no DVT AC secondary to spine surgery  - pain control with standing tylenol 1000mg, dilaudid 2mg and 4mg for mod/severe respectively pain, dilaudid 0.5mg IV for breakthrough pain  - start robaxin PRN muscle spasms  - PT/ OT  - advance diet as tolerated  - anti-emetics PRN  - incentive spirometer  - bowel regimen    #Dizziness  changed dilaudid to tramadol  IV tylenol at 2 pm for pain management   dc planning tomorrow    HTN/HLD  - restart amlodipine on POD #2  - cont ezetimibe  - restart losartan 50mg on POD #2  - cont simvastatin    Preventive measures  - no AC secondary to spine surgery  - SCD only      
69F with HTN, HLD, lumbar radiculopathy who has been suffering with back pain for years.      Lumbar surgery for lumbar radiculopathy - Laminectomy, spine, lumbar, with interlaminar lumbar instrumented fusion with L3-4 right fenestration  - POD #1    - reviewed patient's medical clearance and other outpatient notes  - post-op care as per ortho  - no DVT AC secondary to spine surgery  - pain control with standing tylenol 1000mg, dilaudid 2mg and 4mg for mod/severe respectively pain, dilaudid 0.5mg IV for breakthrough pain  - start robaxin PRN muscle spasms  - PT/ OT  - advance diet as tolerated  - anti-emetics PRN  - incentive spirometer  - bowel regiment  Leukocytosis likely reactive, asymptomatic  Medically optimized for DC    HTN/HLD  - restart amlodipine on POD #2  - cont ezetimibe  - restart losartan 50mg on POD #2  - cont simvastatin    Preventive measures  - no AC secondary to spine surgery  - SCD only

## 2025-06-27 NOTE — PROVIDER CONTACT NOTE (OTHER) - ASSESSMENT
The spine pre-operative education packet was mailed to the patient on 5/28/25. The patient reviewed the information included in the packet. He called the office and reviewed the information with the NP. All his questions were answered, and he gave a clear understanding of the instructions. He was advised to call the office at any time with further questions or concerns.
The spine pre-operative education packet was mailed to the patient on 5/12/25. The patient reviewed the information included in the packet. She called the office and reviewed the information with the NP. All her questions were answered, and she gave a clear understanding of the instructions. She was advised to call the office at any time with further questions or concerns.

## 2025-06-30 ENCOUNTER — APPOINTMENT (OUTPATIENT)
Dept: ORTHOPEDIC SURGERY | Facility: CLINIC | Age: 70
End: 2025-06-30

## 2025-07-02 ENCOUNTER — APPOINTMENT (OUTPATIENT)
Dept: ORTHOPEDIC SURGERY | Facility: CLINIC | Age: 70
End: 2025-07-02
Payer: MEDICARE

## 2025-07-02 PROCEDURE — 99024 POSTOP FOLLOW-UP VISIT: CPT

## 2025-07-02 PROCEDURE — 72110 X-RAY EXAM L-2 SPINE 4/>VWS: CPT

## 2025-08-04 ENCOUNTER — APPOINTMENT (OUTPATIENT)
Dept: ORTHOPEDIC SURGERY | Facility: CLINIC | Age: 70
End: 2025-08-04
Payer: MEDICARE

## 2025-08-04 DIAGNOSIS — Z98.1 ARTHRODESIS STATUS: ICD-10-CM

## 2025-08-04 PROCEDURE — 72110 X-RAY EXAM L-2 SPINE 4/>VWS: CPT

## 2025-08-04 PROCEDURE — 99024 POSTOP FOLLOW-UP VISIT: CPT

## 2025-08-27 ENCOUNTER — APPOINTMENT (OUTPATIENT)
Dept: ORTHOPEDIC SURGERY | Facility: CLINIC | Age: 70
End: 2025-08-27
Payer: MEDICARE

## 2025-08-27 DIAGNOSIS — Z98.1 ARTHRODESIS STATUS: ICD-10-CM

## 2025-08-27 PROCEDURE — 72110 X-RAY EXAM L-2 SPINE 4/>VWS: CPT

## 2025-08-27 PROCEDURE — 99214 OFFICE O/P EST MOD 30 MIN: CPT | Mod: 24

## 2025-08-27 RX ORDER — LIDOCAINE 5% 700 MG/1
5 PATCH TOPICAL
Qty: 1 | Refills: 0 | Status: ACTIVE | COMMUNITY
Start: 2025-08-27 | End: 1900-01-01

## 2025-08-27 RX ORDER — MELOXICAM 15 MG/1
15 TABLET ORAL DAILY
Qty: 30 | Refills: 2 | Status: ACTIVE | COMMUNITY
Start: 2025-08-27 | End: 1900-01-01

## 2025-09-08 ENCOUNTER — APPOINTMENT (OUTPATIENT)
Dept: ORTHOPEDIC SURGERY | Facility: CLINIC | Age: 70
End: 2025-09-08

## 2025-09-08 VITALS — BODY MASS INDEX: 26.06 KG/M2 | WEIGHT: 138 LBS | HEIGHT: 61 IN

## 2025-09-08 DIAGNOSIS — S83.232A COMPLEX TEAR OF MEDIAL MENISCUS, CURRENT INJURY, LEFT KNEE, INITIAL ENCOUNTER: ICD-10-CM

## 2025-09-08 DIAGNOSIS — M17.0 BILATERAL PRIMARY OSTEOARTHRITIS OF KNEE: ICD-10-CM

## 2025-09-08 DIAGNOSIS — Q68.6 DISCOID MENISCUS: ICD-10-CM

## 2025-09-08 DIAGNOSIS — S83.231A COMPLEX TEAR OF MEDIAL MENISCUS, CURRENT INJURY, RIGHT KNEE, INITIAL ENCOUNTER: ICD-10-CM

## 2025-09-09 ENCOUNTER — NON-APPOINTMENT (OUTPATIENT)
Age: 70
End: 2025-09-09

## (undated) DEVICE — STRYKER BONE MILL & MEDIUM BLADE

## (undated) DEVICE — WARMING BLANKET UPPER ADULT

## (undated) DEVICE — Device

## (undated) DEVICE — GLV 7.5 PROTEXIS (WHITE)

## (undated) DEVICE — SPECIMEN CONTAINER 4OZ

## (undated) DEVICE — SOL IRR POUR NS 0.9% 500ML

## (undated) DEVICE — BASIN SET SINGLE

## (undated) DEVICE — GLV 8.5 PROTEXIS (WHITE)

## (undated) DEVICE — DRAPE C ARM UNIVERSAL

## (undated) DEVICE — DRAPE COVER TABLE 44X75"

## (undated) DEVICE — DRSG CURITY GAUZE SPONGE 4 X 4" 12-PLY

## (undated) DEVICE — VENODYNE/SCD SLEEVE CALF MEDIUM

## (undated) DEVICE — ELCTR STRYKER NEPTUNE SMOKE EVACUATION PENCIL (GREEN)

## (undated) DEVICE — SOL IRR POUR H2O 500ML

## (undated) DEVICE — DRAPE MAYO STAND 30"

## (undated) DEVICE — GLV 8 PROTEXIS (WHITE)

## (undated) DEVICE — SUT POLYSORB 1-0 30" GS-12 UNDYED

## (undated) DEVICE — VENODYNE/SCD SLEEVE CALF LARGE

## (undated) DEVICE — ELCTR BOVIE TIP BLADE INSULATED 4" EDGE

## (undated) DEVICE — CATH ANGIOCATH 12G

## (undated) DEVICE — NDL SPINAL 18G X 3.5" (PINK)

## (undated) DEVICE — POSITIONER JACKSON TABLE HEADREST 7", CHEST, HIP, THIGH PADS

## (undated) DEVICE — DRAPE MICROSCOPE LEICA 26" X 150"

## (undated) DEVICE — SUT MONOCRYL 3-0 18" PS-2 UNDYED

## (undated) DEVICE — SUT MONOSOF 2-0 18" C-15

## (undated) DEVICE — DRAPE 3/4 SHEET 52X76"

## (undated) DEVICE — PACK SPINE

## (undated) DEVICE — STRYKER BONE MILL PREP & CARTRIDGE